# Patient Record
Sex: MALE | Race: WHITE | NOT HISPANIC OR LATINO | Employment: UNEMPLOYED | URBAN - METROPOLITAN AREA
[De-identification: names, ages, dates, MRNs, and addresses within clinical notes are randomized per-mention and may not be internally consistent; named-entity substitution may affect disease eponyms.]

---

## 2017-10-05 ENCOUNTER — HOSPITAL ENCOUNTER (INPATIENT)
Facility: CLINIC | Age: 47
LOS: 3 days | Discharge: NURSING, REHAB OR LONG-TERM CARE | End: 2017-10-09
Attending: EMERGENCY MEDICINE | Admitting: PSYCHIATRY & NEUROLOGY
Payer: COMMERCIAL

## 2017-10-05 DIAGNOSIS — F10.120 ALCOHOL ABUSE WITH UNCOMPLICATED INTOXICATION (H): ICD-10-CM

## 2017-10-05 DIAGNOSIS — R10.12 ABDOMINAL PAIN, LEFT UPPER QUADRANT: ICD-10-CM

## 2017-10-05 DIAGNOSIS — G89.29 OTHER CHRONIC BACK PAIN: ICD-10-CM

## 2017-10-05 DIAGNOSIS — R45.851 SUICIDAL IDEATION: ICD-10-CM

## 2017-10-05 DIAGNOSIS — M50.20 HERNIATED DISC, CERVICAL: Primary | ICD-10-CM
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
## 2017-10-05 DIAGNOSIS — F10.920 ALCOHOLIC INTOXICATION WITHOUT COMPLICATION (H): ICD-10-CM

## 2017-10-05 DIAGNOSIS — M54.89 OTHER CHRONIC BACK PAIN: ICD-10-CM
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
## 2017-10-05 DIAGNOSIS — F33.41 RECURRENT MAJOR DEPRESSIVE DISORDER, IN PARTIAL REMISSION (H): ICD-10-CM

## 2017-10-05 DIAGNOSIS — F43.10 PTSD (POST-TRAUMATIC STRESS DISORDER): ICD-10-CM

## 2017-10-05 LAB — ALCOHOL BREATH TEST: NORMAL (ref 0–0.01)

## 2017-10-05 PROCEDURE — 76775 US EXAM ABDO BACK WALL LIM: CPT | Performed by: EMERGENCY MEDICINE

## 2017-10-05 PROCEDURE — 99285 EMERGENCY DEPT VISIT HI MDM: CPT | Mod: 25 | Performed by: EMERGENCY MEDICINE

## 2017-10-05 PROCEDURE — 76775 US EXAM ABDO BACK WALL LIM: CPT | Mod: 26 | Performed by: EMERGENCY MEDICINE

## 2017-10-05 PROCEDURE — 76604 US EXAM CHEST: CPT | Mod: 26 | Performed by: EMERGENCY MEDICINE

## 2017-10-05 PROCEDURE — 76604 US EXAM CHEST: CPT | Performed by: EMERGENCY MEDICINE

## 2017-10-05 PROCEDURE — 82075 ASSAY OF BREATH ETHANOL: CPT | Performed by: EMERGENCY MEDICINE

## 2017-10-05 PROCEDURE — 90791 PSYCH DIAGNOSTIC EVALUATION: CPT

## 2017-10-05 NOTE — IP AVS SNAPSHOT
MRN:[id number]                      After Visit Summary   10/5/2017    Fito Daniel    MRN: [id number]           Thank you!     Thank you for choosing [facility] for your care. Our goal is always to provide you with excellent care.        Patient Information     Date Of Birth          1970        About your hospital stay     You were admitted on:  October 6, 2017 You last received care in the:   32NR    You were discharged on:  October 9, 2017       Who to Call     For medical emergencies, please call 911.  For non-urgent questions about your medical care, please call your primary care provider or clinic, None          Attending Provider     Provider Specialty    William Bui MD Emergency Medicine    Samara, Pb Perez MD Psychiatry       Primary Care Provider    Physician No Ref-Primary      Further instructions from your care team        Behavioral Discharge Planning and Instructions      Summary:  You were admitted on 10/5/2017  due to Depression and Suicidal Ideations.  You were treated by Dr Pb Pascual MD and discharged on 10/9/2017 from [facility] in [facility]      Principal Diagnosis:   1.  Major depressive disorder, recurrent, severe without psychotic features.   2.  Anxiety disorder, not otherwise specified.   3. Amphetamine Use Disorder .   4.  Opiate Use Disorder   5.  Amphetamine and marijuana abuse.   6.  History of alcohol dependence.     Health Care Follow-up Appointments:   Appointment Date/Time: Tomorrow, Tuesday, 10/10/2017 at 9 am     Prescriber: Dr. Jarod Hyatt[facility]  Address: [address] [place] [address] [address], [place] [address]  Phone: [phone]     You were not interested in therapy. You did want some referrals to chemical dependency treatment. The following treatment programs are in [place]:   [facility] at [address], [place] [address]  Phone: [phone]  [facility] has multiple  residential programs. Phone: [phone]  Attend all scheduled appointments with your outpatient providers. Call at least 24 hours in advance if you need to reschedule an appointment to ensure continued access to your outpatient providers.     Shelter Resource: Your girlfriend kicked you out, you've been staying with other homeless people. The following shelters in [place] are available. Please call them about admission.   [facility] [place] [address]  [phone]  [facility] [place] [place] [address]  [phone]    Major Treatments, Procedures and Findings:  You were provided with: a psychiatric assessment, assessed for medical stability, medication evaluation and/or management, group therapy and milieu management    Symptoms to Report: feeling more aggressive, increased confusion, losing more sleep, mood getting worse or thoughts of suicide    Early warning signs can include: increased depression or anxiety sleep disturbances increased thoughts or behaviors of suicide or self-harm  increased unusual thinking, such as paranoia or hearing voices    Safety and Wellness:  Take all medicines as directed.  Make no changes unless your doctor suggests them.      Follow treatment recommendations.  Refrain from alcohol and non-prescribed drugs.  If there is a concern for safety, call 911.    Resources:   [facility] crisis: 107.473.6696      National Central on Mental Illness (www.mn.pedro.org): 902.647.4664 or 415-168-6765.  Alcoholics Anonymous (www.alcoholics-anonymous.org): Check your phone book for your local chapter.  Suicide Awareness Voices of Education (SAVE) (www.save.org): 029-002-WKSR (2024)  National Suicide Prevention Line (www.mentalhealthmn.org): 619-221-PUKR (9125)  Mental Health Consumer/Survivor Network of [place] (www.mhcsn.net): 510.920.9436 or 459-188-2884  Mental Health Association of [place] (www.mentalhealth.org):  "498.802.8306 or 740-052-3237  Text 4 Life: txt \"LIFE\" to 25243 for immediate support and crisis intervention  Crisis text line: Text \"START\" to 837-308. Free, confidential, .    The treatment team has appreciated the opportunity to work with you.     If you have any questions or concerns our unit number is [phone]  You may be receiving a follow-up phone call within the next three days from a representative from behavioral health.    You have identified the best phone number to reach you as [phone]              Pending Results     No orders found from 10/3/2017 to 10/6/2017.            Admission Information     Date & Time Provider Department Dept. Phone    10/5/2017 Pb Pascual MD [facility] 32NR [phone]      Your Vitals Were     Blood Pressure Pulse Temperature Respirations Height Weight    123/82 87 98.3  F (36.8  C) (Oral) 16 1.778 m (5' 10\") 130.4 kg (287 lb 6.4 oz)    Pulse Oximetry BMI (Body Mass Index)                95% 41.24 kg/m2          MyChart Information     [employer] lets you send messages to your doctor, view your test results, renew your prescriptions, schedule appointments and more. To sign up, go to [email or web address] . Click on \"Log in\" on the left side of the screen, which will take you to the Welcome page. Then click on \"Sign up Now\" on the right side of the page.     You will be asked to enter the access code listed below, as well as some personal information. Please follow the directions to create your username and password.     Your access code is: [email or web address]  Expires: 2018  3:44 PM     Your access code will  in 90 days. If you need help or a new code, please call your [facility] or [phone].        Care EveryWhere ID     This is your Care EveryWhere ID. This could be used by other organizations to access your [facility] medical records  [id number]        Equal Access to Services     MADALYN ARRIAGA AH: inna Girard, " siri weems esvinnikky, myranda aidanjoseph lizamaaan ah. So [facility] [phone].    ATENCIÓN: Si jeremy mohr, tiene a love disposición servicios gratuitos de asistencia lingüística. Shala al [phone].    We comply with applicable federal civil rights laws and [place] laws. We do not discriminate on the basis of race, color, national origin, age, disability, sex, sexual orientation, or gender identity.               Review of your medicines      START taking        Dose / Directions    gabapentin 400 MG capsule   Commonly known as:  NEURONTIN   Used for:  PTSD (post-traumatic stress disorder)   Replaces:  gabapentin 600 MG tablet        Dose:  400 mg   Take 1 capsule (400 mg) by mouth 2 times daily   Quantity:  60 capsule   Refills:  1       ibuprofen 800 MG tablet   Commonly known as:  ADVIL/MOTRIN   Used for:  Herniated disc, cervical, Other chronic back pain        Dose:  800 mg   Take 1 tablet (800 mg) by mouth every 8 hours as needed   Quantity:  90 tablet   Refills:  1       thiamine 100 MG tablet   Used for:  Alcohol abuse with uncomplicated intoxication (H)        Dose:  100 mg   Take 1 tablet (100 mg) by mouth daily   Quantity:  30 tablet   Refills:  1         CONTINUE these medicines which may have CHANGED, or have new prescriptions. If we are uncertain of the size of tablets/capsules you have at home, strength may be listed as something that might have changed.        Dose / Directions    buPROPion 150 MG 24 hr tablet   Commonly known as:  WELLBUTRIN XL   This may have changed:  when to take this        Dose:  150 mg   Take 1 tablet (150 mg) by mouth daily   Quantity:  30 tablet   Refills:  1       FLUoxetine 20 MG capsule   Commonly known as:  PROzac   This may have changed:    - medication strength  - how much to take        Dose:  20 mg   Take 1 capsule (20 mg) by mouth daily   Quantity:  30 capsule   Refills:  1         CONTINUE these medicines which have NOT CHANGED        Dose /  Directions    buprenorphine-naloxone 8-2 MG Subl sublingual tablet   Commonly known as:  SUBOXONE   Used for:  Opioid dependence with uncomplicated intoxication (H)        Dose:  1 tablet   Place 1 tablet under the tongue 2 times daily   Quantity:  8 each   Refills:  0       diphenhydrAMINE 50 MG capsule   Commonly known as:  BENADRYL   Used for:  Depression, Insomnia due to other mental disorder        Dose:  50 mg   Take 1 capsule (50 mg) by mouth nightly as needed for sleep (or congestion)   Quantity:  30 capsule   Refills:  1       hydrOXYzine 50 MG tablet   Commonly known as:  ATARAX   Used for:  Depression        Dose:   mg   Take 1-2 tablets ( mg) by mouth every 4 hours as needed for anxiety   Quantity:  120 tablet   Refills:  1       naproxen 500 MG tablet   Commonly known as:  NAPROSYN   Used for:  Other back pain        Dose:  500 mg   Take 1 tablet (500 mg) by mouth 2 times daily (with meals)   Quantity:  60 tablet   Refills:  1       traZODone 50 MG tablet   Commonly known as:  DESYREL   Used for:  Depression, Insomnia due to other mental disorder        Take 1 tablet by mouth at bedtime.  May repeat x 1 as needed for insomnia.   Quantity:  60 tablet   Refills:  1         STOP taking     gabapentin 600 MG tablet   Commonly known as:  NEURONTIN   Replaced by:  gabapentin 400 MG capsule                Where to get your medicines      These medications were sent to [facility] [place], [place] - [address]  [address] [facility] [address]     Phone:  [phone]     buPROPion 150 MG 24 hr tablet    FLUoxetine 20 MG capsule    gabapentin 400 MG capsule    ibuprofen 800 MG tablet    thiamine 100 MG tablet                Protect others around you: Learn how to safely use, store and throw away your medicines at www.disposemymeds.org.             Medication List: This is a list of all your medications and when to take them. Check marks below indicate your daily home  schedule. Keep this list as a reference.      Medications           Morning Afternoon Evening Bedtime As Needed    buprenorphine-naloxone 8-2 MG Subl sublingual tablet   Commonly known as:  SUBOXONE   Place 1 tablet under the tongue 2 times daily                                buPROPion 150 MG 24 hr tablet   Commonly known as:  WELLBUTRIN XL   Take 1 tablet (150 mg) by mouth daily   Last time this was given:  150 mg on 10/9/2017 10:55 AM                                diphenhydrAMINE 50 MG capsule   Commonly known as:  BENADRYL   Take 1 capsule (50 mg) by mouth nightly as needed for sleep (or congestion)                                FLUoxetine 20 MG capsule   Commonly known as:  PROzac   Take 1 capsule (20 mg) by mouth daily   Last time this was given:  20 mg on 10/9/2017 10:54 AM                                gabapentin 400 MG capsule   Commonly known as:  NEURONTIN   Take 1 capsule (400 mg) by mouth 2 times daily   Last time this was given:  400 mg on 10/9/2017 10:54 AM                                hydrOXYzine 50 MG tablet   Commonly known as:  ATARAX   Take 1-2 tablets ( mg) by mouth every 4 hours as needed for anxiety   Last time this was given:  50 mg on 10/8/2017  7:55 PM                                ibuprofen 800 MG tablet   Commonly known as:  ADVIL/MOTRIN   Take 1 tablet (800 mg) by mouth every 8 hours as needed                                naproxen 500 MG tablet   Commonly known as:  NAPROSYN   Take 1 tablet (500 mg) by mouth 2 times daily (with meals)                                thiamine 100 MG tablet   Take 1 tablet (100 mg) by mouth daily   Last time this was given:  100 mg on 10/8/2017 10:20 AM                                traZODone 50 MG tablet   Commonly known as:  DESYREL   Take 1 tablet by mouth at bedtime.  May repeat x 1 as needed for insomnia.

## 2017-10-05 NOTE — IP AVS SNAPSHOT
32NR    [address]    [place] [address]    Phone:  [phone]                                       After Visit Summary   10/5/2017    Fito Daniel    MRN: [id number]           After Visit Summary Signature Page     I have received my discharge instructions, and my questions have been answered. I have discussed any challenges I see with this plan with the nurse or doctor.    ..........................................................................................................................................  Patient/Patient Representative Signature      ..........................................................................................................................................  Patient Representative Print Name and Relationship to Patient    ..................................................               ................................................  Date                                            Time    ..........................................................................................................................................  Reviewed by Signature/Title    ...................................................              ..............................................  Date                                                            Time

## 2017-10-06 ENCOUNTER — APPOINTMENT (OUTPATIENT)
Dept: GENERAL RADIOLOGY | Facility: CLINIC | Age: 47
End: 2017-10-06
Attending: EMERGENCY MEDICINE
Payer: COMMERCIAL

## 2017-10-06 PROBLEM — F32.A DEPRESSION: Status: ACTIVE | Noted: 2017-10-06

## 2017-10-06 LAB
ALBUMIN UR-MCNC: NEGATIVE MG/DL
AMPHETAMINES UR QL SCN: POSITIVE
APPEARANCE UR: CLEAR
BARBITURATES UR QL: NEGATIVE
BENZODIAZ UR QL: NEGATIVE
BILIRUB UR QL STRIP: NEGATIVE
CANNABINOIDS UR QL SCN: NEGATIVE
COCAINE UR QL: NEGATIVE
COLOR UR AUTO: YELLOW
ETHANOL UR QL SCN: POSITIVE
GLUCOSE UR STRIP-MCNC: NEGATIVE MG/DL
HGB UR QL STRIP: NEGATIVE
KETONES UR STRIP-MCNC: NEGATIVE MG/DL
LEUKOCYTE ESTERASE UR QL STRIP: NEGATIVE
MUCOUS THREADS #/AREA URNS LPF: PRESENT /LPF
NITRATE UR QL: NEGATIVE
OPIATES UR QL SCN: NEGATIVE
PH UR STRIP: 7 PH (ref 5–7)
RBC #/AREA URNS AUTO: <1 /HPF (ref 0–2)
SOURCE: ABNORMAL
SP GR UR STRIP: 1.02 (ref 1–1.03)
UROBILINOGEN UR STRIP-MCNC: NORMAL MG/DL (ref 0–2)
WBC #/AREA URNS AUTO: <1 /HPF (ref 0–2)

## 2017-10-06 PROCEDURE — 99222 1ST HOSP IP/OBS MODERATE 55: CPT | Mod: AI | Performed by: PSYCHIATRY & NEUROLOGY

## 2017-10-06 PROCEDURE — 25000132 ZZH RX MED GY IP 250 OP 250 PS 637: Performed by: PSYCHIATRY & NEUROLOGY

## 2017-10-06 PROCEDURE — 81001 URINALYSIS AUTO W/SCOPE: CPT | Performed by: FAMILY MEDICINE

## 2017-10-06 PROCEDURE — 80320 DRUG SCREEN QUANTALCOHOLS: CPT | Performed by: FAMILY MEDICINE

## 2017-10-06 PROCEDURE — 71020 XR CHEST 2 VW: CPT

## 2017-10-06 PROCEDURE — 99207 ZZC CDG-CODE INCORRECT PER BILLING BASED ON TIME: CPT | Performed by: PSYCHIATRY & NEUROLOGY

## 2017-10-06 PROCEDURE — HZ2ZZZZ DETOXIFICATION SERVICES FOR SUBSTANCE ABUSE TREATMENT: ICD-10-PCS | Performed by: PSYCHIATRY & NEUROLOGY

## 2017-10-06 PROCEDURE — 12400007 ZZH R&B MH INTERMEDIATE UMMC

## 2017-10-06 PROCEDURE — 80307 DRUG TEST PRSMV CHEM ANLYZR: CPT | Performed by: FAMILY MEDICINE

## 2017-10-06 RX ORDER — BUPROPION HYDROCHLORIDE 150 MG/1
150 TABLET ORAL DAILY
Status: DISCONTINUED | OUTPATIENT
Start: 2017-10-07 | End: 2017-10-09 | Stop reason: HOSPADM

## 2017-10-06 RX ORDER — GABAPENTIN 400 MG/1
400 CAPSULE ORAL 2 TIMES DAILY
Status: DISCONTINUED | OUTPATIENT
Start: 2017-10-06 | End: 2017-10-09 | Stop reason: HOSPADM

## 2017-10-06 RX ORDER — MULTIPLE VITAMINS W/ MINERALS TAB 9MG-400MCG
1 TAB ORAL DAILY
Status: DISCONTINUED | OUTPATIENT
Start: 2017-10-06 | End: 2017-10-09 | Stop reason: HOSPADM

## 2017-10-06 RX ORDER — FOLIC ACID 1 MG/1
1 TABLET ORAL DAILY
Status: DISCONTINUED | OUTPATIENT
Start: 2017-10-06 | End: 2017-10-09 | Stop reason: HOSPADM

## 2017-10-06 RX ORDER — FLUOXETINE 10 MG/1
10 CAPSULE ORAL DAILY
Status: COMPLETED | OUTPATIENT
Start: 2017-10-06 | End: 2017-10-06

## 2017-10-06 RX ORDER — IBUPROFEN 800 MG/1
800 TABLET, FILM COATED ORAL EVERY 6 HOURS PRN
Status: DISCONTINUED | OUTPATIENT
Start: 2017-10-06 | End: 2017-10-06

## 2017-10-06 RX ORDER — DICLOFENAC SODIUM 75 MG/1
75 TABLET, DELAYED RELEASE ORAL 2 TIMES DAILY PRN
Status: DISCONTINUED | OUTPATIENT
Start: 2017-10-06 | End: 2017-10-08

## 2017-10-06 RX ORDER — TRAZODONE HYDROCHLORIDE 50 MG/1
50 TABLET, FILM COATED ORAL
Status: DISCONTINUED | OUTPATIENT
Start: 2017-10-06 | End: 2017-10-09 | Stop reason: HOSPADM

## 2017-10-06 RX ORDER — HYDROXYZINE HYDROCHLORIDE 25 MG/1
25-50 TABLET, FILM COATED ORAL EVERY 4 HOURS PRN
Status: DISCONTINUED | OUTPATIENT
Start: 2017-10-06 | End: 2017-10-09 | Stop reason: HOSPADM

## 2017-10-06 RX ORDER — LANOLIN ALCOHOL/MO/W.PET/CERES
100 CREAM (GRAM) TOPICAL DAILY
Status: DISPENSED | OUTPATIENT
Start: 2017-10-06 | End: 2017-10-09

## 2017-10-06 RX ORDER — DIAZEPAM 5 MG
5-20 TABLET ORAL EVERY 30 MIN PRN
Status: DISCONTINUED | OUTPATIENT
Start: 2017-10-06 | End: 2017-10-08

## 2017-10-06 RX ORDER — ACETAMINOPHEN 325 MG/1
650 TABLET ORAL EVERY 4 HOURS PRN
Status: DISCONTINUED | OUTPATIENT
Start: 2017-10-06 | End: 2017-10-09 | Stop reason: HOSPADM

## 2017-10-06 RX ADMIN — GABAPENTIN 400 MG: 400 CAPSULE ORAL at 12:51

## 2017-10-06 RX ADMIN — FLUOXETINE 10 MG: 10 CAPSULE ORAL at 12:51

## 2017-10-06 ASSESSMENT — ACTIVITIES OF DAILY LIVING (ADL)
DRESS: INDEPENDENT
DRESS: INDEPENDENT
GROOMING: PROMPTS
GROOMING: INDEPENDENT
ORAL_HYGIENE: INDEPENDENT
ORAL_HYGIENE: PROMPTS
LAUNDRY: UNABLE TO COMPLETE
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
## 2017-10-06 NOTE — PROGRESS NOTES
PATIENT BELONGINGS:    Items with Patient:  -Two T-shirts and one pair of cargo shorts    Items in Patient Locker:  -belt, lighter, shoes with laces, pants with drawstring    ---No Items sent to security at time of admission.  ---No Cash, No Credit/Debit Cards, and No Medications at the time of admission.    A               Admission:  I am responsible for any personal items that are not sent to the safe or pharmacy.  [facility] is not responsible for loss, theft or damage of any property in my possession.    Signature:  _________________________________ Date: _______  Time: _____                                              Staff Signature:  ____________________________ Date: ________  Time: _____      2nd Staff person, if patient is unable/unwilling to sign:    Signature: ________________________________ Date: ________  Time: _____     Discharge:  [facility] has returned all of my personal belongings:    Signature: _________________________________ Date: ________  Time: _____                                          Staff Signature:  ____________________________ Date: ________  Time: _____
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
## 2017-10-06 NOTE — PLAN OF CARE
"Problem: Depressive Symptoms  Goal: Depressive Symptoms  Signs and symptoms of listed problems will be absent or manageable.  Outcome: No Change  Pt was in his room for most of the day. Pt refused breakfast, meds and to talk to writer. Pt stated \"I just want to sleep\". Pt is blunted, flat, withdrawn and somewhat irritable\". Pt came out for lunch, took some meds and requested the Dr order him Subutex for pain, pt has not been on it for over 1 month per pt.  notified. Told pt Ibuprofen has been ordered. Pt is now in his room sleeping again. Pt has no complaints about ETOH W/D. MSSA done, score equals 3. Will continue to monitor.       "

## 2017-10-06 NOTE — PROGRESS NOTES
INITIAL PSYCHOSOCIAL ASSESSMENT AND NOTE  I have reviewed the chart met with the patient, and developed Care Plan.  Information for assessment was obtained from: pt and chart  PRESENTING PROBLEM: Pt admitted to station 32 on a voluntary basis due to reported suicidal ideation with thoughts of jumping off bridge or overdosing. Pt had been drinking, breathalyzer 0.10, drug screen positive for amphetamines. Drinking daily X 4-6 weeks. Not taking Suboxone for 4-6 weeks. Girlfriend kicked him out 1 month ago because of his drinking. Pt reports playing Mauritian roulette last night.   The following areas have been assessed:  History of Mental Health and Chemical Dependency: This is pt's 2nd inpatient mh admit; first admit was 10/2015 following death of mother  Hx of 4 past CD treatments, last was  at [facility]  Living Situation: has been living in a homeless camp since girlfriend kicked him out 1 month ago  Significant Life Events (Illness, Abuse, Trauma, Death): mother  in  from heart attack. Dad  many years.   Family Description (Constellation, Family Psychiatric History): Pt is , 1 daughter age 12 who lives in [place] with her mother. Pt is the second of 4 children, 2 brothers, 1 sister.   Multiple family members have hx of substance abuse issues. No known family hx of mh issues.   Financial Status: strained, no income  Occupational History: unemployed, hx working construction  Educational Background: completed 3 years college     Service History: none  Legal Issues: none  Ethnic/Cultural Issues:   Spiritual Orientation:   Social Functioning (organizations, interests): minimal support, denies hobbies or pleasurable activities    Current Treatment providers:   Medication Management: Dr. Jarod [facility]  Address: [address] [place] [address] [address], [place] [address]  Phone: [phone]      Social Service Assessment/Plan:   Pt to  stabilize on medication. Pt will be seen and assessed by provider and staff. Groups will be offered to assist pt with increasing knowledge, strategies and coping techniques to help manage mental health. CTC will meet with pt to provide individualized mental health resources and support. CTC will assist with developing a care plan and after hospital appointments.       Discharge Plan:   Health Care Follow-up Appointments:   Appointment Date/Time: Tomorrow, Tuesday, 10/10/2017 at 9 am     Prescriber: Dr. Jarod [facility]  Address: [address] [place] [address] [address], [place] [address]  Phone: [phone]     You were not interested in therapy. You did want some referrals to chemical dependency treatment. The following treatment programs are in [place]:   [facility] at [address] [place] [address], [place] [address]  Phone: [phone]  [facility] has multiple residential programs. Phone: [phone]  Attend all scheduled appointments with your outpatient providers. Call at least 24 hours in advance if you need to reschedule an appointment to ensure continued access to your outpatient providers.     Shelter Resource: Your girlfriend kicked you out, you've been staying with other homeless people. The following shelters in [place] are available. Please call them about admission.   [facility]  [place], [place] [id number] [phone]  [facility]   [place], [place] [id number] [phone]

## 2017-10-06 NOTE — PLAN OF CARE
Problem: General Plan of Care (Inpatient Behavioral)  Goal: Team Discussion  Team Plan:   BEHAVIORAL TEAM DISCUSSION     Participants: Dr. Samara Neal, CTC  Lulú Godinez RN     Progress: Pt newly admitted due to suicidal ideation with plan; played Armenian roulette last night. Pt has increased depression, increased drinking. Off medication  Continued Stay Criteria/Rationale: pt is suicidal, depressed  Medical/Physical: see chart  Precautions:   Behavioral Orders   Procedures     Code 1 - Restrict to Unit     Routine Programming       As clinically indicated     Status 15       Every 15 minutes.     Suicide precautions     Plan: The patient will continue to meet w/ the treatment team for assessment and treatment planning, CTC will continue to work w/ for discharge planning.     Rationale for change in precautions or plan: pt is suicidal, depressed, significant stressors.

## 2017-10-06 NOTE — CONSULTS
Brief medicine consult note:  - Met with pt briefly to discuss pain management of his chronic neck pain. Due to his h/o opioid dependence, he was made clear that only non-narcotic medications would be used and he expressed understanding. Therefore will order prn Tylenol and prescription prn diclofenac. D/w Dr. Pascual. Pt otherwise medically stable and medicine will sign off. Please feel free to call with questions.       Vinay Thomas PA-C  Internal Medicine Hospitalist & Staff [facility]  [phone]

## 2017-10-06 NOTE — ED NOTES
"To  doing his search:    \"If I had a gun I would have already used it.\"    As he was putting his clothes in the bag, \"watch out for my 45.\"  "

## 2017-10-07 PROCEDURE — 25000132 ZZH RX MED GY IP 250 OP 250 PS 637: Performed by: EMERGENCY MEDICINE

## 2017-10-07 PROCEDURE — 25000132 ZZH RX MED GY IP 250 OP 250 PS 637: Performed by: PHYSICIAN ASSISTANT

## 2017-10-07 PROCEDURE — 25000132 ZZH RX MED GY IP 250 OP 250 PS 637: Performed by: PSYCHIATRY & NEUROLOGY

## 2017-10-07 PROCEDURE — 12400007 ZZH R&B MH INTERMEDIATE UMMC

## 2017-10-07 RX ADMIN — MULTIPLE VITAMINS W/ MINERALS TAB 1 TABLET: TAB at 08:44

## 2017-10-07 RX ADMIN — HYDROXYZINE HYDROCHLORIDE 50 MG: 25 TABLET ORAL at 13:40

## 2017-10-07 RX ADMIN — BUPROPION HYDROCHLORIDE 150 MG: 150 TABLET, EXTENDED RELEASE ORAL at 08:44

## 2017-10-07 RX ADMIN — FLUOXETINE 20 MG: 20 CAPSULE ORAL at 08:44

## 2017-10-07 RX ADMIN — FOLIC ACID 1 MG: 1 TABLET ORAL at 08:44

## 2017-10-07 RX ADMIN — Medication 100 MG: at 08:44

## 2017-10-07 RX ADMIN — DICLOFENAC SODIUM 75 MG: 75 TABLET, DELAYED RELEASE ORAL at 12:50

## 2017-10-07 RX ADMIN — GABAPENTIN 400 MG: 400 CAPSULE ORAL at 08:44

## 2017-10-07 ASSESSMENT — ACTIVITIES OF DAILY LIVING (ADL)
ORAL_HYGIENE: INDEPENDENT
ORAL_HYGIENE: INDEPENDENT
GROOMING: INDEPENDENT
DRESS: STREET CLOTHES;SCRUBS (BEHAVIORAL HEALTH);INDEPENDENT
DRESS: INDEPENDENT
GROOMING: HANDWASHING;INDEPENDENT

## 2017-10-07 NOTE — H&P
"[facility], [place]   Psychiatric History & Physical  Admission date: 10/5/2017        Chief Complaint:   \"I've just been out of it, mood is messed up\"        HPI:   Fito is a 47 year old male with history of depression, meth use disorder, opiate use disorder is admitted on a voluntary basis for worsening depressive moods, with suicidal ideation with recent attempts. He has been depressed since his mother  2 years ago, and has worsened recently, with increased suicidal thoughts and actions. He has drank alcohol daily for past 4-6 weeks, including recent use of meth which he used with a groups of friends. He has been kicked out of his girlfriend's house (whom he was living with) and has been homeless. He has been with a group of friends that have told him to stop taking his medications, along with using illicit substances. He has tried jumping off a bridge recently (someone pulled him off), he tried ODing but woke up, and tried playing russian NanoICEe with a bullet in one of the chambers. He continues to have some passive SI, without any plan. He is dysphoric, anhedonic, low energy/fatigued, lacks any interests in participating in things, and has fractured sleep. He mentions having continued anxiety about his life, along with pain, and mentions having cervical herniation pain in his neck. Denies HI/AH/VH.           Past Psychiatric History:   Past inpatient treatment: Past hospitalization in [place] in 10/2015 for similar conditions   Current outpatient psychiatrist: Dr. Savage at [facility]  Current outpatient therapist: Denies  Other (ACT team etc): Denies   Past suicide attempts: Similar suicide attemps, with attempting to jump off bridge   History of commitments: None  History of ECT: None        Substance Use and History:   Tobacco: Occational use  Alcohol: drinks to the point of intoxication daily for 4-5 weeks  Marijuana: Occational use  Cocaine: No recent " use  Amphetamines: Recent meth use  Opioids: Denies recent use, although has been on Suboxone 2 months ago, and had Dr. Savage prescribe his outpatient doses.    Last CD treatment 2 years ago         Past Medical History:   PAST MEDICAL HISTORY:   Past Medical History:   Diagnosis Date     ADD (attention deficit disorder)      Bipolar 1 disorder (H)      Borderline schizophrenia      Depressive disorder      Herniated disc, cervical      PTSD (post-traumatic stress disorder)      Substance abuse        PAST SURGICAL HISTORY:   Past Surgical History:   Procedure Laterality Date     SOFT TISSUE SURGERY      right foot MERSA             Family History:   FAMILY HISTORY: No family history on file.        Social History:   SOCIAL HISTORY:   Social History   Substance Use Topics     Smoking status: Current Some Day Smoker     Packs/day: 0.25     Last attempt to quit: 12/19/2011     Smokeless tobacco: Never Used     Alcohol use Yes            Physical ROS:   The patient endorsed rib pain, fatigue, and muscle pain. The remainder of 10-point review of systems was negative except as noted in HPI.         PTA Medications:     Prescriptions Prior to Admission   Medication Sig Dispense Refill Last Dose     BuPROPion HCl (WELLBUTRIN XL PO) Take 150 mg by mouth 2 times daily   More than a month at Unknown time     naproxen (NAPROSYN) 500 MG tablet Take 1 tablet (500 mg) by mouth 2 times daily (with meals) 60 tablet 1 More than a month at Unknown time     hydrOXYzine (ATARAX) 50 MG tablet Take 1-2 tablets ( mg) by mouth every 4 hours as needed for anxiety 120 tablet 1 More than a month at Unknown time     gabapentin (NEURONTIN) 600 MG tablet Take 1 tablet (600 mg) by mouth At Bedtime 30 tablet 1 More than a month at Unknown time     FLUoxetine (PROZAC) 40 MG capsule Take 1 capsule (40 mg) by mouth daily 30 capsule 1 More than a month at Unknown time     traZODone (DESYREL) 50 MG tablet Take 1 tablet by mouth at bedtime.  May  repeat x 1 as needed for insomnia. 60 tablet 1 More than a month at Unknown time     diphenhydrAMINE (BENADRYL) 50 MG capsule Take 1 capsule (50 mg) by mouth nightly as needed for sleep (or congestion) 30 capsule 1 Unknown at Unknown time     buprenorphine-naloxone (SUBOXONE) 8-2 MG SUBL Place 1 tablet under the tongue 2 times daily 8 each 0 More than a month at Unknown time          Allergies:     Allergies   Allergen Reactions     Hydrocodone Itching          Labs:     Recent Results (from the past 48 hour(s))   Alcohol breath test POCT    Collection Time: 10/05/17  9:47 PM   Result Value Ref Range    Alcohol Breath Test 0.105 with very poor effort. 0.00 - 0.01   Drug abuse screen 6 urine (tox)    Collection Time: 10/06/17  4:38 AM   Result Value Ref Range    Amphetamine Qual Urine Positive (A) NEG^Negative    Barbiturates Qual Urine Negative NEG^Negative    Benzodiazepine Qual Urine Negative NEG^Negative    Cannabinoids Qual Urine Negative NEG^Negative    Cocaine Qual Urine Negative NEG^Negative    Ethanol Qual Urine Positive (A) NEG^Negative    Opiates Qualitative Urine Negative NEG^Negative   UA with Microscopic reflex to Culture    Collection Time: 10/06/17  4:38 AM   Result Value Ref Range    Color Urine Yellow     Appearance Urine Clear     Glucose Urine Negative NEG^Negative mg/dL    Bilirubin Urine Negative NEG^Negative    Ketones Urine Negative NEG^Negative mg/dL    Specific Gravity Urine 1.017 1.003 - 1.035    Blood Urine Negative NEG^Negative    pH Urine 7.0 5.0 - 7.0 pH    Protein Albumin Urine Negative NEG^Negative mg/dL    Urobilinogen mg/dL Normal 0.0 - 2.0 mg/dL    Nitrite Urine Negative NEG^Negative    Leukocyte Esterase Urine Negative NEG^Negative    Source Clean catch urine     WBC Urine <1 0 - 2 /HPF    RBC Urine <1 0 - 2 /HPF    Mucous Urine Present (A) NEG^Negative /LPF          Physical and Psychiatric Examination:     /72  Pulse 98  Temp 98.4  F (36.9  C) (Oral)  Resp 16  Ht 1.778  "m (5' 10\")  Wt 82.7 kg (182 lb 6.4 oz)  SpO2 95%  BMI 26.17 kg/m2  Weight is 182 lbs 6.4 oz  Body mass index is 26.17 kg/(m^2).    Physical Exam:  I have reviewed the physical exam as documented by William Bui MD on 10/5/2017 and agree with findings and assessment and have no additional findings to add at this time.    Mental Status Exam:  Appearance: Disheleved  male. Multiple tattoos on forearms  Attitude:  Guarded, irritable  Eye Contact:  Intermittent  Mood:  \"Tired\"  Affect:  Mood congruent  Speech:  Regular in rate and rhythm  Language: fluent and intact in English  Psychomotor, Gait, Musculoskeletal:  Unremarkable  Throught Process:  [place]   Associations:  No loosening  Thought Content:  Passive SI, no HI/AH/VH  Insight:  Poor  Judgement:  Poor  Oriented to:  Person,place, time  Attention Span and Concentration:  Poor   Recent and Remote Memory:  Unclear, possibly deficient   Fund of Knowledge:  Average         Admission Diagnoses:      1.  Major depressive disorder, recurrent, severe without psychotic features.   2.  Anxiety disorder, not otherwise specified.   3. Amphetamine Use Disorder .   4.  Opiate Use Disorder   5.  Amphetamine and marijuana abuse.   6.  History of alcohol dependence.          Assessment & Plan:     Assessment:  Recent exacerbation of depression, with multiple suicidal behaviors with ongoing ideation. Depressive mood likely pecipitated/perpetuated by continued alcohol use along with an overlay of stimulant discontinuation/withdrawl effects. MSSA scores have been ranging 5-7 overall. He endorses pain, and has been on Percoet and recently Suboxone. He did request Suboxone, however we must first confirm if he will be in an place post discharge that will continue to prescribe Suboxone (unclear were he will be at this point), additionally his utox was not positive for opiates, and he denies using any synthetic opiates before. He does have documented cervical neck " herniation, although we will manage his pain via non-opiate pain medications at this time. He will need CD eval/treatment, and is willing to go to treatment. He has been off of his medications for over a month ,thus we will restart those.      Plan:  - Prozac 10 mg today, continue with 20 mg tomorrow  - Gabapentin 400 mg TID  - Wellbutrin  mg  - Thiamine 100 mg Daily     Legal:  Voluntary     Disposition:  Unclear at this time     Pb Pascual MD  [facility]    Spent 50   minutes on encounter, >50% of which was spent in counseling and/or coordination of care, consisting of taking history, reviewing systems, discussing medications and CD treatment.

## 2017-10-07 NOTE — PROGRESS NOTES
No SI, SIB stated/observed. Isolated/withdrawn to room all shift, refused to check in.     10/06/17 0250   Behavioral Health   Hallucinations other (see comment)  (LESLIE)   Thinking other (see comment)  (LESLIE)   Orientation other (see comment)  (LESLIE)   Memory other (see comment)  (LESLIE)   Insight poor   Judgement impaired   Eye Contact at examiner   Affect irritable   Mood mood is calm   Physical Appearance/Attire attire appropriate to age and situation   Hygiene neglected grooming - unclean body, hair, teeth   Suicidality other (see comments)  (no SI, SIB stated/observed)   Self Injury other (see comment)  (no si, sib stated/observed)   Elopement (N/A)   Activity isolative;withdrawn   Speech clear;coherent   Medication Sensitivity no stated side effects;no observed side effects   Psychomotor / Gait balanced;steady   Psycho Education   Type of Intervention 1:1 intervention   Response refuses   Activities of Daily Living   Hygiene/Grooming prompts   Oral Hygiene prompts   Dress independent   Laundry unable to complete   Room Organization independent   Behavioral Health Interventions   Depression maintain safety precautions;monitor need to revise level of observation;maintain safe secure environment;encourage nutrition and hydration;encourage participation / independence with adls;provide emotional support;establish therapeutic relationship;build upon strengths;monitor need for prn medication;monitor confusion, memory loss, decision making ability and reorient / intervene as needed   Social and Therapeutic Interventions (Depression) encourage socialization with peers;encourage effective boundaries with peers;encourage participation in therapeutic groups and milieu activities

## 2017-10-07 NOTE — PROGRESS NOTES
"Patient remained in room much of shift.  He declined to complete admission interview and refused HS Gabapentin.  At beginning of shift he stated \"I need subutex for my neck pain\", although he had already been told by his provider that opiates would not be prescribed.  Patient declined offer of tylenol and diclofenac.  Patient denied alcohol withdrawal symptoms.  MSSA scores were 6 and 3 due to elevated heart rate.  Later in shift he stated \"just let me sleep.\"  "

## 2017-10-08 PROCEDURE — 12400007 ZZH R&B MH INTERMEDIATE UMMC

## 2017-10-08 PROCEDURE — 25000132 ZZH RX MED GY IP 250 OP 250 PS 637: Performed by: EMERGENCY MEDICINE

## 2017-10-08 PROCEDURE — 25000132 ZZH RX MED GY IP 250 OP 250 PS 637: Performed by: PHYSICIAN ASSISTANT

## 2017-10-08 PROCEDURE — 25000132 ZZH RX MED GY IP 250 OP 250 PS 637: Performed by: PSYCHIATRY & NEUROLOGY

## 2017-10-08 RX ORDER — IBUPROFEN 800 MG/1
800 TABLET, FILM COATED ORAL EVERY 8 HOURS PRN
Status: DISCONTINUED | OUTPATIENT
Start: 2017-10-08 | End: 2017-10-09 | Stop reason: HOSPADM

## 2017-10-08 RX ADMIN — MULTIPLE VITAMINS W/ MINERALS TAB 1 TABLET: TAB at 10:18

## 2017-10-08 RX ADMIN — HYDROXYZINE HYDROCHLORIDE 50 MG: 25 TABLET ORAL at 19:55

## 2017-10-08 RX ADMIN — FOLIC ACID 1 MG: 1 TABLET ORAL at 10:20

## 2017-10-08 RX ADMIN — GABAPENTIN 400 MG: 400 CAPSULE ORAL at 10:20

## 2017-10-08 RX ADMIN — GABAPENTIN 400 MG: 400 CAPSULE ORAL at 19:55

## 2017-10-08 RX ADMIN — BUPROPION HYDROCHLORIDE 150 MG: 150 TABLET, EXTENDED RELEASE ORAL at 10:21

## 2017-10-08 RX ADMIN — Medication 100 MG: at 10:20

## 2017-10-08 RX ADMIN — FLUOXETINE 20 MG: 20 CAPSULE ORAL at 10:20

## 2017-10-08 RX ADMIN — NICOTINE POLACRILEX 4 MG: 2 GUM, CHEWING ORAL at 14:32

## 2017-10-08 RX ADMIN — DICLOFENAC SODIUM 75 MG: 75 TABLET, DELAYED RELEASE ORAL at 10:20

## 2017-10-08 ASSESSMENT — ACTIVITIES OF DAILY LIVING (ADL)
DRESS: INDEPENDENT
GROOMING: INDEPENDENT
ORAL_HYGIENE: INDEPENDENT
DRESS: STREET CLOTHES;INDEPENDENT
GROOMING: HANDWASHING;INDEPENDENT
ORAL_HYGIENE: INDEPENDENT

## 2017-10-08 NOTE — PLAN OF CARE
Problem: Depressive Symptoms  Goal: Depressive Symptoms  Signs and symptoms of listed problems will be absent or manageable.   Outcome: No Change  Patient was in the lounge until dinner, then went to bed.  He refused 1:1, HS gabapentin and offer of ibuprofen or diclofenac.  MSSA score was 3 for heart rate of 85.  Patient denied all withdrawal symptoms and none were visible.  He also denied suicidal thoughts.  Nursing will attempt to complete admission interview again when patient is cooperative.

## 2017-10-08 NOTE — PROGRESS NOTES
Pt c/o back pain. Pt stated he has herniated disc. Pt stated he takes Suboxone for the pain due to his opioid addition. Pt stated he was taking 8mg TID. Writer spoke with on-call and on-call prescribed ibuprofen 800mg every 8 hours PRN until pt sees Psychiatrist tomorrow.   Writer offered pt Ibuprofen and pt refused.  Pts MSSA was discontinued. Pts scored 6 and has not received medication since arriving to unit. Pt has not scored over 6 since arriving.

## 2017-10-09 ENCOUNTER — APPOINTMENT (OUTPATIENT)
Dept: GENERAL RADIOLOGY | Facility: CLINIC | Age: 47
End: 2017-10-09
Attending: EMERGENCY MEDICINE
Payer: COMMERCIAL

## 2017-10-09 ENCOUNTER — HOSPITAL ENCOUNTER (EMERGENCY)
Facility: CLINIC | Age: 47
Discharge: HOME | End: 2017-10-09
Attending: EMERGENCY MEDICINE | Admitting: EMERGENCY MEDICINE
Payer: COMMERCIAL

## 2017-10-09 ENCOUNTER — APPOINTMENT (OUTPATIENT)
Dept: ULTRASOUND IMAGING | Facility: CLINIC | Age: 47
End: 2017-10-09
Attending: EMERGENCY MEDICINE
Payer: COMMERCIAL

## 2017-10-09 VITALS
OXYGEN SATURATION: 98 % | SYSTOLIC BLOOD PRESSURE: 116 MMHG | TEMPERATURE: 97.4 F | DIASTOLIC BLOOD PRESSURE: 82 MMHG | RESPIRATION RATE: 16 BRPM

## 2017-10-09 VITALS
HEART RATE: 87 BPM | SYSTOLIC BLOOD PRESSURE: 123 MMHG | BODY MASS INDEX: 41.14 KG/M2 | DIASTOLIC BLOOD PRESSURE: 82 MMHG | HEIGHT: 70 IN | OXYGEN SATURATION: 95 % | TEMPERATURE: 98.3 F | WEIGHT: 287.4 LBS | RESPIRATION RATE: 16 BRPM

## 2017-10-09 DIAGNOSIS — M79.662 PAIN OF LEFT LOWER LEG: ICD-10-CM

## 2017-10-09 PROCEDURE — 25000132 ZZH RX MED GY IP 250 OP 250 PS 637: Performed by: PSYCHIATRY & NEUROLOGY

## 2017-10-09 PROCEDURE — 99283 EMERGENCY DEPT VISIT LOW MDM: CPT | Mod: Z6 | Performed by: EMERGENCY MEDICINE

## 2017-10-09 PROCEDURE — 73590 X-RAY EXAM OF LOWER LEG: CPT | Mod: LT

## 2017-10-09 PROCEDURE — 25000132 ZZH RX MED GY IP 250 OP 250 PS 637: Performed by: EMERGENCY MEDICINE

## 2017-10-09 PROCEDURE — 99284 EMERGENCY DEPT VISIT MOD MDM: CPT | Mod: 25 | Performed by: EMERGENCY MEDICINE

## 2017-10-09 PROCEDURE — 93971 EXTREMITY STUDY: CPT | Mod: LT

## 2017-10-09 PROCEDURE — 99238 HOSP IP/OBS DSCHRG MGMT 30/<: CPT | Performed by: PSYCHIATRY & NEUROLOGY

## 2017-10-09 RX ORDER — BUPROPION HYDROCHLORIDE 150 MG/1
150 TABLET ORAL DAILY
Qty: 30 TABLET | Refills: 1 | Status: SHIPPED | OUTPATIENT
Start: 2017-10-09 | End: 2024-04-10

## 2017-10-09 RX ORDER — IBUPROFEN 800 MG/1
800 TABLET, FILM COATED ORAL EVERY 8 HOURS PRN
Qty: 90 TABLET | Refills: 1 | Status: SHIPPED | OUTPATIENT
Start: 2017-10-09 | End: 2024-04-09

## 2017-10-09 RX ORDER — GABAPENTIN 400 MG/1
400 CAPSULE ORAL 2 TIMES DAILY
Qty: 60 CAPSULE | Refills: 1 | Status: SHIPPED | OUTPATIENT
Start: 2017-10-09 | End: 2024-04-09

## 2017-10-09 RX ORDER — LANOLIN ALCOHOL/MO/W.PET/CERES
100 CREAM (GRAM) TOPICAL DAILY
Qty: 30 TABLET | Refills: 1 | Status: SHIPPED | OUTPATIENT
Start: 2017-10-09 | End: 2024-04-10

## 2017-10-09 RX ADMIN — FLUOXETINE 20 MG: 20 CAPSULE ORAL at 10:54

## 2017-10-09 RX ADMIN — BUPROPION HYDROCHLORIDE 150 MG: 150 TABLET, EXTENDED RELEASE ORAL at 10:55

## 2017-10-09 RX ADMIN — NICOTINE POLACRILEX 4 MG: 2 GUM, CHEWING ORAL at 15:45

## 2017-10-09 RX ADMIN — FOLIC ACID 1 MG: 1 TABLET ORAL at 10:54

## 2017-10-09 RX ADMIN — GABAPENTIN 400 MG: 400 CAPSULE ORAL at 10:54

## 2017-10-09 RX ADMIN — NICOTINE POLACRILEX 4 MG: 2 GUM, CHEWING ORAL at 12:17

## 2017-10-09 RX ADMIN — NICOTINE POLACRILEX 4 MG: 2 GUM, CHEWING ORAL at 13:41

## 2017-10-09 RX ADMIN — MULTIPLE VITAMINS W/ MINERALS TAB 1 TABLET: TAB at 10:54

## 2017-10-09 ASSESSMENT — ACTIVITIES OF DAILY LIVING (ADL)
LAUNDRY: WITH SUPERVISION
GROOMING: INDEPENDENT
ORAL_HYGIENE: INDEPENDENT
DRESS: STREET CLOTHES

## 2017-10-09 NOTE — ED AVS SNAPSHOT
[facility], Emergency Department    [address]    [place] [address]    Phone:  [phone]    Fax:  [phone]                                       Fito Daniel   MRN: [id number]    Department:  [facility], Emergency Department   Date of Visit:  10/9/2017           Patient Information     Date Of Birth          1970        Your diagnoses for this visit were:     Pain of left lower leg        You were seen by Wilton Pedro MD.        Discharge Instructions       Please make an appointment to follow up with Your Primary Care Provider and Orthopedics (phone: [phone]) as soon as possible.    Tylenol and/or ibuprofen for pain.    Knee immobilizer for comfort.    Return to the emergency department for any problems.      24 Hour Appointment Hotline       To make an appointment at any [facility] clinic, call [phone] ([phone]). If you don't have a family doctor or clinic, we will help you find one. [facility] clinics are conveniently located to serve the needs of you and your family.          ED Discharge Orders     Knee immobilizer                    Review of your medicines      Our records show that you are taking the medicines listed below. If these are incorrect, please call your family doctor or clinic.        Dose / Directions Last dose taken    buprenorphine-naloxone 8-2 MG Subl sublingual tablet   Commonly known as:  SUBOXONE   Dose:  1 tablet   Quantity:  8 each        Place 1 tablet under the tongue 2 times daily   Refills:  0        buPROPion 150 MG 24 hr tablet   Commonly known as:  WELLBUTRIN XL   Dose:  150 mg   Quantity:  30 tablet        Take 1 tablet (150 mg) by mouth daily   Refills:  1        diphenhydrAMINE 50 MG capsule   Commonly known as:  BENADRYL   Dose:  50 mg   Quantity:  30 capsule        Take 1 capsule (50 mg) by mouth nightly as needed for sleep (or congestion)   Refills:  1        FLUoxetine 20 MG capsule   Commonly known as:  PROzac    Dose:  20 mg   Quantity:  30 capsule        Take 1 capsule (20 mg) by mouth daily   Refills:  1        gabapentin 400 MG capsule   Commonly known as:  NEURONTIN   Dose:  400 mg   Quantity:  60 capsule        Take 1 capsule (400 mg) by mouth 2 times daily   Refills:  1        hydrOXYzine 50 MG tablet   Commonly known as:  ATARAX   Dose:   mg   Quantity:  120 tablet        Take 1-2 tablets ( mg) by mouth every 4 hours as needed for anxiety   Refills:  1        ibuprofen 800 MG tablet   Commonly known as:  ADVIL/MOTRIN   Dose:  800 mg   Quantity:  90 tablet        Take 1 tablet (800 mg) by mouth every 8 hours as needed   Refills:  1        naproxen 500 MG tablet   Commonly known as:  NAPROSYN   Dose:  500 mg   Quantity:  60 tablet        Take 1 tablet (500 mg) by mouth 2 times daily (with meals)   Refills:  1        thiamine 100 MG tablet   Dose:  100 mg   Quantity:  30 tablet        Take 1 tablet (100 mg) by mouth daily   Refills:  1        traZODone 50 MG tablet   Commonly known as:  DESYREL   Quantity:  60 tablet        Take 1 tablet by mouth at bedtime.  May repeat x 1 as needed for insomnia.   Refills:  1                Procedures and tests performed during your visit     Tib/Fib XR, left    US Lower Extremity Venous Duplex Left      Orders Needing Specimen Collection     None      Pending Results     No orders found from 10/7/2017 to 10/10/2017.            Pending Culture Results     No orders found from 10/7/2017 to 10/10/2017.            Pending Results Instructions     If you had any lab results that were not finalized at the time of your Discharge, you can call the ED Lab Result RN at [phone]. You will be contacted by this team for any positive Lab results or changes in treatment. The nurses are available 7 days a week from 10A to 6:30P.  You can leave a message 24 hours per day and they will return your call.        Thank you for choosing [facility]       Thank you for choosing [facility] for  "your care. Our goal is always to provide you with excellent care. Hearing back from our patients is one way we can continue to improve our services. Please take a few minutes to complete the written survey that you may receive in the mail after you visit with us. Thank you!        [employer] Information     tadoÂ° lets you send messages to your doctor, view your test results, renew your prescriptions, schedule appointments and more. To sign up, go to [email or web address] . Click on \"Log in\" on the left side of the screen, which will take you to the Welcome page. Then click on \"Sign up Now\" on the right side of the page.     You will be asked to enter the access code listed below, as well as some personal information. Please follow the directions to create your username and password.     Your access code is: [email or web address]  Expires: 2018  3:44 PM     Your access code will  in 90 days. If you need help or a new code, please call your [facility] clinic or [phone].        Care EveryWhere ID     This is your Care EveryWhere ID. This could be used by other organizations to access your [facility] medical records  [id number]        Equal Access to Services     PASCUAL ARRIAGA AH: Juan C Lopez, inna duong, siri katz, myranda acosta. So [facility] [phone].    ATENCIÓN: Si habla español, tiene a love disposición servicios gratuitos de asistencia lingüística. Llame al [phone].    We comply with applicable federal civil rights laws and [place] laws. We do not discriminate on the basis of race, color, national origin, age, disability, sex, sexual orientation, or gender identity.            After Visit Summary       This is your record. Keep this with you and show to your community pharmacist(s) and doctor(s) at your next visit.                  "

## 2017-10-09 NOTE — PLAN OF CARE
Problem: Depressive Symptoms  Goal: Depressive Symptoms  Signs and symptoms of listed problems will be absent or manageable.   Outcome: No Change  Patient again in lounge from mid-afternoon til dinner, but did not interact with peers.  He refused  PRN ibuprofen for neck pain.  Patient declined to meet with writer to complete admission interview, and was visibly uncomfortable when asked about suicidal ideation, which he denied, and physical pain, which he endorsed.  Patient had a female visitor this evening.  After she left patient requested medication for anxiety, which he rated 9 out of 10, and was given PRN Vistaril.  He also agreed to take scheduled Gabapentin, and when asked about depression rated it 8 out of 10.

## 2017-10-09 NOTE — PLAN OF CARE
"Problem: Depressive Symptoms  Goal: Depressive Symptoms  Signs and symptoms of listed problems will be absent or manageable.   Outcome: Adequate for Discharge Date Met:  10/09/17  Patient denied suicidal ideation and stated he can keep himself safe after discharge.  Writer attempted to review discharge instructions with him but he declined.  Patient stated \"I don't need to do nothing with those.  I know the drill.\"  He stated he had no questions.  He was discharged to home with supply of discharge medications at 1620.      "

## 2017-10-09 NOTE — DISCHARGE INSTRUCTIONS
Behavioral Discharge Planning and Instructions      Summary:  You were admitted on 10/5/2017  due to Depression and Suicidal Ideations.  You were treated by Dr Pb Pascual MD and discharged on 10/9/2017 from [facility] in [facility]      Principal Diagnosis:   1.  Major depressive disorder, recurrent, severe without psychotic features.   2.  Anxiety disorder, not otherwise specified.   3. Amphetamine Use Disorder .   4.  Opiate Use Disorder   5.  Amphetamine and marijuana abuse.   6.  History of alcohol dependence.     Health Care Follow-up Appointments:   Appointment Date/Time: Tomorrow, Tuesday, 10/10/2017 at 9 am     Prescriber: Dr. Jarod [facility]  Address: [address] [place] [address] [id number], [place] [address]  Phone: [phone]     You were not interested in therapy. You did want some referrals to chemical dependency treatment. The following treatment programs are in [place]:   [facility] at [address] [place] [address], [place] [address]  Phone: [phone]  [facility] has multiple residential programs. Phone: [phone]  Attend all scheduled appointments with your outpatient providers. Call at least 24 hours in advance if you need to reschedule an appointment to ensure continued access to your outpatient providers.     Shelter Resource: Your girlfriend kicked you out, you've been staying with other homeless people. The following shelters in [place] are available. Please call them about admission.   [facility] [place], [place] [id number] [phone]  [facility]   [place], [place] [id number] [phone]    Major Treatments, Procedures and Findings:  You were provided with: a psychiatric assessment, assessed for medical stability, medication evaluation and/or management, group therapy and milieu management    Symptoms to Report: feeling more aggressive, increased confusion,  "losing more sleep, mood getting worse or thoughts of suicide    Early warning signs can include: increased depression or anxiety sleep disturbances increased thoughts or behaviors of suicide or self-harm  increased unusual thinking, such as paranoia or hearing voices    Safety and Wellness:  Take all medicines as directed.  Make no changes unless your doctor suggests them.      Follow treatment recommendations.  Refrain from alcohol and non-prescribed drugs.  If there is a concern for safety, call 911.    Resources:   [facility] crisis: [phone]      National [place] on Mental Illness (www.[place].pedro.org): 819.585.3493 or 424-169-9310.  Alcoholics Anonymous (www.alcoholics-anonymous.org): Check your phone book for your local chapter.  Suicide Awareness Voices of Education (SAVE) (www.save.org): 437-360-WEHN (4783)  National Suicide Prevention Line (www.mentalhealthmn.org): 303-474-AZLU (3261)  Mental Health Consumer/Survivor Network of [place] (www.mhcsn.net): 916.985.5510 or 334-549-5161  Mental Health Association of [place] (www.mentalhealth.org): 201.206.3101 or 952-133-0740  Text 4 Life: txt \"LIFE\" to 43267 for immediate support and crisis intervention  Crisis text line: Text \"START\" to 309-470. Free, confidential, 24/7.    The treatment team has appreciated the opportunity to work with you.     If you have any questions or concerns our unit number is [phone]  You may be receiving a follow-up phone call within the next three days from a representative from behavioral health.    You have identified the best phone number to reach you as [phone]            "

## 2017-10-09 NOTE — ED AVS SNAPSHOT
[facility], [place], Emergency Department    [address]    [facility] [address]    Phone:  [phone]    Fax:  [phone]                                       Fito Daniel   MRN: [id number]    Department:  [facility], Emergency Department   Date of Visit:  10/9/2017           After Visit Summary Signature Page     I have received my discharge instructions, and my questions have been answered. I have discussed any challenges I see with this plan with the nurse or doctor.    ..........................................................................................................................................  Patient/Patient Representative Signature      ..........................................................................................................................................  Patient Representative Print Name and Relationship to Patient    ..................................................               ................................................  Date                                            Time    ..........................................................................................................................................  Reviewed by Signature/Title    ...................................................              ..............................................  Date                                                            Time

## 2017-10-09 NOTE — DISCHARGE SUMMARY
[facility], [place]   Psychiatric Discharge Summary      Fito Daniel MRN# [id number]   Age: 47 year old YOB: 1970     Date of Admission:  10/5/2017  Date of Discharge:  10/09/17  Admitting Physician:  Pb Pascual MD  Discharge Physician:  Pb Pascual MD         Summary/Hospital Course/Disposition:   Hospital Summary:    Fito is a 47 year old male with history of depression, meth use disorder, opiate use disorder is admitted on 10/5 on a voluntary basis for worsening depressive moods, with suicidal ideation with recent attempts. He has been depressed since his mother  2 years ago, and has worsened recently, with increased suicidal thoughts and actions. He had not been on his medications for over a month, which also likely had precipitated his worsening depression. His utox was positive to meth and alcohol. He was placed on MSSA protocol with Valium PRN. He was restarted on Prozac and gabapentin. He requested Suboxone, but [place]  revealed he has not been prescribed this since 2017 by Dr. Savage, thus we would not induce him with Suboxone here, and recommended to follow up with Dr. Savage for that medication. His MSSA scores remained low at 4-5 for over 12 hours, and didn't require any Valium, thus MSSA protocol was d/c'd. He tolerated medications and has subjective improvements in mood, and sleep. He denied suicidal ideation , homicidal ideation or any AH/VH. He was willing to go to CD treatment. He was given information for CD assessment, along with information for follow up appointment with Dr. Savage (which he made for tomorrow).            DIagnoses:       1.  Major depressive disorder, recurrent, severe without psychotic features.   2.  Anxiety disorder, not otherwise specified.   3. Amphetamine Use Disorder .   4.  Opiate Use Disorder   5.  Amphetamine and marijuana abuse.   6.  History of alcohol dependence.          Labs:   No results found for  this or any previous visit (from the past 24 hour(s)).         Consults:   Brief medicine consult note:  - Met with pt briefly to discuss pain management of his chronic neck pain. Due to his h/o opioid dependence, he was made clear that only non-narcotic medications would be used and he expressed understanding. Therefore will order prn Tylenol and prescription prn diclofenac. D/w Dr. Pascual. Pt otherwise medically stable and medicine will sign off. Please feel free to call with questions.            Discharge Medications:        Review of your medicines      START taking       Dose / Directions    gabapentin 400 MG capsule   Commonly known as:  NEURONTIN   Used for:  PTSD (post-traumatic stress disorder)   Replaces:  gabapentin 600 MG tablet        Dose:  400 mg   Take 1 capsule (400 mg) by mouth 2 times daily   Quantity:  60 capsule   Refills:  1       ibuprofen 800 MG tablet   Commonly known as:  ADVIL/MOTRIN   Used for:  Herniated disc, cervical, Other chronic back pain        Dose:  800 mg   Take 1 tablet (800 mg) by mouth every 8 hours as needed   Quantity:  90 tablet   Refills:  1       thiamine 100 MG tablet   Used for:  Alcohol abuse with uncomplicated intoxication (H)        Dose:  100 mg   Take 1 tablet (100 mg) by mouth daily   Quantity:  30 tablet   Refills:  1         CONTINUE these medicines which may have CHANGED, or have new prescriptions. If we are uncertain of the size of tablets/capsules you have at home, strength may be listed as something that might have changed.       Dose / Directions    buPROPion 150 MG 24 hr tablet   Commonly known as:  WELLBUTRIN XL   This may have changed:  when to take this        Dose:  150 mg   Take 1 tablet (150 mg) by mouth daily   Quantity:  30 tablet   Refills:  1       FLUoxetine 20 MG capsule   Commonly known as:  PROzac   This may have changed:    - medication strength  - how much to take        Dose:  20 mg   Take 1 capsule (20 mg) by mouth daily   Quantity:  30  capsule   Refills:  1         CONTINUE these medicines which have NOT CHANGED       Dose / Directions    buprenorphine-naloxone 8-2 MG Subl sublingual tablet   Commonly known as:  SUBOXONE   Used for:  Opioid dependence with uncomplicated intoxication (H)        Dose:  1 tablet   Place 1 tablet under the tongue 2 times daily   Quantity:  8 each   Refills:  0       diphenhydrAMINE 50 MG capsule   Commonly known as:  BENADRYL   Used for:  Depression, Insomnia due to other mental disorder        Dose:  50 mg   Take 1 capsule (50 mg) by mouth nightly as needed for sleep (or congestion)   Quantity:  30 capsule   Refills:  1       hydrOXYzine 50 MG tablet   Commonly known as:  ATARAX   Used for:  Depression        Dose:   mg   Take 1-2 tablets ( mg) by mouth every 4 hours as needed for anxiety   Quantity:  120 tablet   Refills:  1       naproxen 500 MG tablet   Commonly known as:  NAPROSYN   Used for:  Other back pain        Dose:  500 mg   Take 1 tablet (500 mg) by mouth 2 times daily (with meals)   Quantity:  60 tablet   Refills:  1       traZODone 50 MG tablet   Commonly known as:  DESYREL   Used for:  Depression, Insomnia due to other mental disorder        Take 1 tablet by mouth at bedtime.  May repeat x 1 as needed for insomnia.   Quantity:  60 tablet   Refills:  1         STOP taking          gabapentin 600 MG tablet   Commonly known as:  NEURONTIN   Replaced by:  gabapentin 400 MG capsule                Where to get your medicines      These medications were sent to [facility] [place], [place] - [address]  [address] [address]     Phone:  [phone]      buPROPion 150 MG 24 hr tablet     FLUoxetine 20 MG capsule     gabapentin 400 MG capsule     ibuprofen 800 MG tablet     thiamine 100 MG tablet                  Mental Status Examination:   Appearance: Disheleved  male. Multiple tattoos on forearms  Attitude:  Guarded, irritable  Eye  "Contact:  Intermittent  Mood:  \"Ok\"  Affect:  Mood congruent  Speech:  Regular in rate and rhythm  Language: fluent and intact in English  Psychomotor, Gait, Musculoskeletal:  Unremarkable  Throught Process:  [place]   Associations:  No loosening  Thought Content:  No SI/HI/AH/VH  Insight:  Poor  Judgement:  Poor  Oriented to:  Person,place, time  Attention Span and Concentration:  Poor   Recent and Remote Memory:  Unclear, possibly deficient   Fund of Knowledge:  Average         Discharge Plan:   No discharge procedures on file.    - Patient was discharged with medications, information about CD assessment, and follow up appointment with Dr. Savage for his pain issues.     Pb Pascual MD  [facility]     Spent  20  minutes on encounter, >50% of which was spent in counseling and/or coordination of care, consisting of discussing symptoms, medications, and follow up information.       "

## 2017-10-10 NOTE — PROGRESS NOTES
10/10/17 0600   General Information   Date Initially Attended OT 10/09/17   General Observation/Plan   General Observations/Plan See Comments     Pt initially attempted to attend OT 10/9, but was only there 5 minutes and was called out. Returned to take a drawing he started, left, did not return. Pt has been discharged.
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
## 2017-10-10 NOTE — DISCHARGE INSTRUCTIONS
Please make an appointment to follow up with Your Primary Care Provider and Orthopedics (phone: [phone]) as soon as possible.    Tylenol and/or ibuprofen for pain.    Knee immobilizer for comfort.    Return to the emergency department for any problems.

## 2023-11-18 ENCOUNTER — TELEPHONE (OUTPATIENT)
Dept: BEHAVIORAL HEALTH | Facility: CLINIC | Age: 53
End: 2023-11-18
Payer: COMMERCIAL

## 2023-11-18 ENCOUNTER — HOSPITAL ENCOUNTER (EMERGENCY)
Facility: CLINIC | Age: 53
Discharge: NURSING, REHAB OR LONG-TERM CARE | End: 2023-11-20
Attending: EMERGENCY MEDICINE | Admitting: EMERGENCY MEDICINE
Payer: COMMERCIAL

## 2023-11-18 DIAGNOSIS — R45.851 SUICIDAL IDEATION: ICD-10-CM

## 2023-11-18 DIAGNOSIS — F11.90 OPIOID USE DISORDER: ICD-10-CM

## 2023-11-18 DIAGNOSIS — F20.9 SUBCHRONIC SCHIZOPHRENIA (H): ICD-10-CM

## 2023-11-18 DIAGNOSIS — F11.20 OPIOID TYPE DEPENDENCE, CONTINUOUS (H): Primary | ICD-10-CM

## 2023-11-18 DIAGNOSIS — F90.0 ATTENTION DEFICIT HYPERACTIVITY DISORDER, INATTENTIVE TYPE: ICD-10-CM

## 2023-11-18 PROBLEM — F19.180: Status: ACTIVE | Noted: 2023-11-18

## 2023-11-18 PROBLEM — F33.41 MAJOR DEPRESSIVE DISORDER, RECURRENT EPISODE, IN PARTIAL REMISSION (H): Status: ACTIVE | Noted: 2023-11-18

## 2023-11-18 PROCEDURE — 99285 EMERGENCY DEPT VISIT HI MDM: CPT | Performed by: EMERGENCY MEDICINE

## 2023-11-18 PROCEDURE — G2213 INITIAT MED ASSIST TX IN ER: HCPCS | Performed by: EMERGENCY MEDICINE

## 2023-11-18 PROCEDURE — 87635 SARS-COV-2 COVID-19 AMP PRB: CPT | Performed by: EMERGENCY MEDICINE

## 2023-11-18 PROCEDURE — 99285 EMERGENCY DEPT VISIT HI MDM: CPT

## 2023-11-18 PROCEDURE — 81001 URINALYSIS AUTO W/SCOPE: CPT | Mod: XU | Performed by: EMERGENCY MEDICINE

## 2023-11-18 PROCEDURE — 80307 DRUG TEST PRSMV CHEM ANLYZR: CPT | Performed by: EMERGENCY MEDICINE

## 2023-11-18 RX ORDER — TRAZODONE HYDROCHLORIDE 50 MG/1
50 TABLET, FILM COATED ORAL
Status: DISCONTINUED | OUTPATIENT
Start: 2023-11-18 | End: 2023-11-20 | Stop reason: HOSPADM

## 2023-11-18 RX ORDER — HYDROXYZINE HYDROCHLORIDE 50 MG/1
50 TABLET, FILM COATED ORAL EVERY 6 HOURS PRN
Status: DISCONTINUED | OUTPATIENT
Start: 2023-11-18 | End: 2023-11-20 | Stop reason: HOSPADM

## 2023-11-18 RX ORDER — IBUPROFEN 200 MG
400 TABLET ORAL EVERY 6 HOURS PRN
Status: DISCONTINUED | OUTPATIENT
Start: 2023-11-18 | End: 2023-11-20 | Stop reason: HOSPADM

## 2023-11-18 RX ORDER — HYDROXYZINE HYDROCHLORIDE 25 MG/1
25 TABLET, FILM COATED ORAL EVERY 6 HOURS PRN
Status: DISCONTINUED | OUTPATIENT
Start: 2023-11-18 | End: 2023-11-20 | Stop reason: HOSPADM

## 2023-11-18 ASSESSMENT — ACTIVITIES OF DAILY LIVING (ADL)
ADLS_ACUITY_SCORE: 35

## 2023-11-18 NOTE — ED PROVIDER NOTES
"ED Provider Note  [facility]      History     Chief Complaint   Patient presents with    Suicidal     Has a plan to kill self with drugs, specifically fentanyl and has a way to obtain it. Uses fentanyl IV and last used last night. Denies other drug or alcohol use.     Hand Pain     Recent injury with ongoing swelling and pain     HPI  Fito Daniel is a 53 year old male with hx of bipolar 1, borderline schizophrenia, MDD, PTSD, substance abuse who presents to the ED suicidal with a plan to overdose on fentanyl.  He lives at the [place] and says drugs are easily available.  He denies trigger.  He has hx of 12 prior overdose attempts.  He has been using drugs on and off per his advocate.  He last used fentanyl last night.  He refuses to safety plan.  He days his suicidal thoughts are \"10/10.\"  He has not been on mental health meds for over 2 years but would like to get back on them. He does not have any providers.  Hx of prior admissions.  He also has left hand pain which has been constant since a bad injection in October.  It is not worsening, just the same.     Physical Exam   BP: 112/75  Pulse: 79  Temp: 98.3  F (36.8  C)  Resp: 16  Height: 177.8 cm (5' 10\")  Weight: 85.9 kg (189 lb 6.4 oz)  SpO2: 98 %  Physical Exam  Vitals and nursing note reviewed.   Constitutional:       General: He is not in acute distress.     Appearance: He is not ill-appearing, toxic-appearing or diaphoretic.      Comments: Sleepy, but awakens easily.    HENT:      Head: Normocephalic and atraumatic.      Nose: No congestion or rhinorrhea.   Eyes:      Extraocular Movements: Extraocular movements intact.   Cardiovascular:      Rate and Rhythm: Normal rate.      Pulses: Normal pulses.   Pulmonary:      Effort: Pulmonary effort is normal.   Musculoskeletal:         General: No deformity or signs of injury.      Cervical back: Normal range of motion.   Skin:     General: Skin is warm and dry.      Comments: Red " scar back of left hand.  Not warm to touch. Appears old.    Neurological:      General: No focal deficit present.      Mental Status: He is alert and oriented to person, place, and time.   Psychiatric:         Attention and Perception: Attention and perception normal.         Mood and Affect: Mood is depressed.         Speech: Speech normal.         Behavior: Behavior is withdrawn.         Thought Content: Thought content includes suicidal ideation. Thought content includes suicidal plan.         Cognition and Memory: Cognition and memory normal.         Judgment: Judgment normal.           ED Course, Procedures, & Data      Procedures       Mental Health Risk Assessment        PSS-3      Date and Time Over the past 2 weeks have you felt down, depressed, or hopeless? Over the past 2 weeks have you had thoughts of killing yourself? Have you ever attempted to kill yourself? When did this last happen? User   11/18/23 1320 yes yes yes more than 6 months ago RMK          C-SSRS ([place])      Date and Time Q1 Wished to be Dead (Past Month) Q2 Suicidal Thoughts (Past Month) Q3 Suicidal Thought Method Q4 Suicidal Intent without Specific Plan Q5 Suicide Intent with Specific Plan Q6 Suicide Behavior (Lifetime) Within the Past 3 Months? RETIRED: Level of Risk per Screen Screening Not Complete User   11/18/23 1320 yes yes yes no no -- -- -- -- RMK                Suicide assessment completed by mental health (D.E.C., LCSW, etc.)       No results found for any visits on 11/18/23.  Medications   hydrOXYzine (ATARAX) tablet 25 mg (has no administration in time range)     Or   hydrOXYzine (ATARAX) tablet 50 mg (has no administration in time range)   ibuprofen (ADVIL/MOTRIN) tablet 400 mg (has no administration in time range)     Labs Ordered and Resulted from Time of ED Arrival to Time of ED Departure - No data to display  No orders to display          Critical care was not performed.     Medical Decision Making  The patient's  presentation was of high complexity (a chronic illness severe exacerbation, progression, or side effect of treatment).    The patient's evaluation involved:  review of external note(s) from 1 sources (admission note 10/5/17)  review of 1 test result(s) ordered prior to this encounter (hand xray 10/19/23)  discussion of management or test interpretation with another health professional (dec )    The patient's management necessitated high risk (a decision regarding hospitalization).    Assessment & Plan    Fito Daniel is a 53 year old male with hx of bipolar 1, borderline schizophrenia, MDD, PTSD, substance abuse who presents to the ED suicidal with a plan to overdose on fentanyl.  Hx of several prior attempts in the past, no providers, and unable to contract for safety.  Dec assessment done, case discussed and they recommend inpatient admission. I agree with this plan.  Voluntary admit. Would need reassessment if he wants to leave. Given ibuprofen for hand pain (chronic past month) and ibuprofen for pain.  Psychiatry consult requested.     I have reviewed the nursing notes. I have reviewed the findings, diagnosis, plan and need for follow up with the patient.    New Prescriptions    No medications on file       Final diagnoses:   Suicidal ideation   Opioid use disorder       Luisa Barbosa MD  [facility] EMERGENCY DEPARTMENT  11/18/2023     Luisa Barbosa MD  11/18/23 3738

## 2023-11-18 NOTE — ED NOTES
IP MH Referral Acuity Rating Score (RARS)    LMHP complete at referral to IP MH, with DEC; and, daily while awaiting IP MH placement. Call score to PPS.  CRITERIA SCORING   New 72 HH and Involuntary for IP MH (not adolescent) 0/1   Boarding over 24 hours 0/1   Vulnerable adult at least 55+ with multiple co morbidities; or, Patient age 11 or under 0/1   Suicide ideation without relief of precipitating factors 1/1   Current plan for suicide 1/1   Current plan for homicide 0/1   Imminent risk or actual attempt to seriously harm another without relief of factors precipitating the attempt 1/1   Severe dysfunction in daily living (ex: complete neglect for self care, extreme disruption in vegetative function, extreme deterioration in social interactions) 0/1   Recent (last 2 weeks) or current physical aggression in the ED 0/1   Restraints or seclusion episode in ED 0/1   Verbal aggression, agitation, yelling, etc., while in the ED 0/1   Active psychosis with psychomotor agitation or catatonia 0/1   Need for constant or near constant redirection (from leaving, from others, etc).  0/1   Intrusive or disruptive behaviors 0/1   TOTAL Acuity Total Score: 3
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
## 2023-11-18 NOTE — TELEPHONE ENCOUNTER
S: [facility] ED DEC  Alicia  calling at 2:37 pm about a 53 year old/Male presenting with SI with plan to od.      B: Pt arrived via  his advocate . Presenting problem, stressors:  SI with plan to od. He admits to using fentanyl last night. He says he can't identify a current stressor. He says he started going through withdrawal last night and started feeling suicidal at that point. He gives conflicting info, as he says he was in withdrawal, but he denies regular opiate use.His advocate says he uses opiates regularly, unk amt, unk freq.  Refuses to safety plan. He is homeless and lives in an encampment.     Pt affect in ED: Disengaged   Pt Dx: ADHD, borderline schizophrenia, bipolar I, dep d/o, ptsd, substance use d/o  Previous IPMH hx? Yes: Oct 2015  Pt endorses SI with a plan to od.     Hx of suicide attempt? Yes: hx of 12 od's as SA's  Pt denies SIB  Pt denies HI   Pt denies hallucinations .   Pt RARS Score: 3    Hx of aggression/violence, sexual offenses, legal concerns, Epic care plan? describe: no  Current concerns for aggression this visit? No  Does pt have a history of Civil Commitment? No  Is Pt their own guardian? Yes    Pt is prescribed medication. Is patient medication compliant? No; off meds for 3 years.   Pt endorses OP services: [facility]  and an advocate through the [facility]   CD concerns: He admits to using fentanyl last night. He says he started going through withdrawal last night. He gives conflicting info, as he says he was in withdrawal, but he denies regular opiate use.His advocate says he uses opiates regularly, unk amt, unk freq; he also says he abuses other pain meds at times but denies regular use; last cd tx was in 2015 at [facility]  Acute or chronic medical concerns: no  Does Pt present with specific needs, assistive devices, or exclusionary criteria? None      Pt is ambulatory  Pt is able to perform ADLs independently      A: Pt to be reviewed for [facility] admission. Pt is  Voluntary  Preferred placement: Statewide    COVID Symptoms: No  If yes, COVID test required   Utox: Ordered, not yet collected   CMP: n/a  CBC: n/a  HCG: N/A    R: Patient cleared and ready for behavioral bed placement: Yes  Pt placed on IP worklist? Yes    Does Patient need a Transfer Center request created? No, Pt is located within [facility] ED, [facility] ED, or [facility] ED

## 2023-11-18 NOTE — ED NOTES
Pt resting on cart. He is tearful. Pt does not offer much to conversation. Pt advocate is at bedside. Pt is calm and cooperative currently. Pt was changed into scrubs. 1:1 initiated. Hx of bipolar & schizophrenia, pt states he has not taken any of his meds.

## 2023-11-19 ENCOUNTER — TELEPHONE (OUTPATIENT)
Dept: BEHAVIORAL HEALTH | Facility: CLINIC | Age: 53
End: 2023-11-19

## 2023-11-19 VITALS
OXYGEN SATURATION: 100 % | BODY MASS INDEX: 27.11 KG/M2 | WEIGHT: 189.4 LBS | TEMPERATURE: 98.6 F | RESPIRATION RATE: 16 BRPM | DIASTOLIC BLOOD PRESSURE: 64 MMHG | HEART RATE: 66 BPM | HEIGHT: 70 IN | SYSTOLIC BLOOD PRESSURE: 120 MMHG

## 2023-11-19 LAB
ALBUMIN SERPL BCG-MCNC: 3.8 G/DL (ref 3.5–5.2)
ALBUMIN UR-MCNC: NEGATIVE MG/DL
ALP SERPL-CCNC: 93 U/L (ref 40–150)
ALT SERPL W P-5'-P-CCNC: 24 U/L (ref 0–70)
AMPHETAMINES UR QL SCN: ABNORMAL
ANION GAP SERPL CALCULATED.3IONS-SCNC: 8 MMOL/L (ref 7–15)
APAP SERPL-MCNC: <5 UG/ML (ref 10–30)
APPEARANCE UR: CLEAR
AST SERPL W P-5'-P-CCNC: 21 U/L (ref 0–45)
BARBITURATES UR QL SCN: ABNORMAL
BASOPHILS # BLD AUTO: 0 10E3/UL (ref 0–0.2)
BASOPHILS NFR BLD AUTO: 1 %
BENZODIAZ UR QL SCN: ABNORMAL
BILIRUB SERPL-MCNC: 0.4 MG/DL
BILIRUB UR QL STRIP: NEGATIVE
BUN SERPL-MCNC: 15.2 MG/DL (ref 6–20)
BZE UR QL SCN: ABNORMAL
CALCIUM SERPL-MCNC: 9 MG/DL (ref 8.6–10)
CANNABINOIDS UR QL SCN: ABNORMAL
CHLORIDE SERPL-SCNC: 106 MMOL/L (ref 98–107)
COLOR UR AUTO: YELLOW
CREAT SERPL-MCNC: 0.66 MG/DL (ref 0.67–1.17)
DEPRECATED HCO3 PLAS-SCNC: 26 MMOL/L (ref 22–29)
EGFRCR SERPLBLD CKD-EPI 2021: >90 ML/MIN/1.73M2
EOSINOPHIL # BLD AUTO: 0 10E3/UL (ref 0–0.7)
EOSINOPHIL NFR BLD AUTO: 0 %
ERYTHROCYTE [DISTWIDTH] IN BLOOD BY AUTOMATED COUNT: 14.7 % (ref 10–15)
ETHANOL SERPL-MCNC: <0.01 G/DL
FENTANYL UR QL: ABNORMAL
GLUCOSE SERPL-MCNC: 119 MG/DL (ref 70–99)
GLUCOSE UR STRIP-MCNC: NEGATIVE MG/DL
HCT VFR BLD AUTO: 38.9 % (ref 40–53)
HGB BLD-MCNC: 12.7 G/DL (ref 13.3–17.7)
HGB UR QL STRIP: NEGATIVE
IMM GRANULOCYTES # BLD: 0 10E3/UL
IMM GRANULOCYTES NFR BLD: 0 %
KETONES UR STRIP-MCNC: NEGATIVE MG/DL
LEUKOCYTE ESTERASE UR QL STRIP: NEGATIVE
LYMPHOCYTES # BLD AUTO: 1.3 10E3/UL (ref 0.8–5.3)
LYMPHOCYTES NFR BLD AUTO: 20 %
MCH RBC QN AUTO: 26.6 PG (ref 26.5–33)
MCHC RBC AUTO-ENTMCNC: 32.6 G/DL (ref 31.5–36.5)
MCV RBC AUTO: 81 FL (ref 78–100)
MONOCYTES # BLD AUTO: 0.4 10E3/UL (ref 0–1.3)
MONOCYTES NFR BLD AUTO: 6 %
MUCOUS THREADS #/AREA URNS LPF: PRESENT /LPF
NEUTROPHILS # BLD AUTO: 4.9 10E3/UL (ref 1.6–8.3)
NEUTROPHILS NFR BLD AUTO: 73 %
NITRATE UR QL: NEGATIVE
NRBC # BLD AUTO: 0 10E3/UL
NRBC BLD AUTO-RTO: 0 /100
OPIATES UR QL SCN: ABNORMAL
PCP QUAL URINE (ROCHE): ABNORMAL
PH UR STRIP: 7 [PH] (ref 5–7)
PLATELET # BLD AUTO: 361 10E3/UL (ref 150–450)
POTASSIUM SERPL-SCNC: 4.1 MMOL/L (ref 3.4–5.3)
PROT SERPL-MCNC: 7.1 G/DL (ref 6.4–8.3)
RBC # BLD AUTO: 4.78 10E6/UL (ref 4.4–5.9)
RBC URINE: 1 /HPF
SALICYLATES SERPL-MCNC: <0.3 MG/DL
SARS-COV-2 RNA RESP QL NAA+PROBE: NEGATIVE
SODIUM SERPL-SCNC: 140 MMOL/L (ref 135–145)
SP GR UR STRIP: 1.02 (ref 1–1.03)
SQUAMOUS EPITHELIAL: 1 /HPF
UROBILINOGEN UR STRIP-MCNC: NORMAL MG/DL
WBC # BLD AUTO: 6.7 10E3/UL (ref 4–11)
WBC URINE: 1 /HPF

## 2023-11-19 PROCEDURE — 87389 HIV-1 AG W/HIV-1&-2 AB AG IA: CPT

## 2023-11-19 PROCEDURE — 82077 ASSAY SPEC XCP UR&BREATH IA: CPT | Performed by: EMERGENCY MEDICINE

## 2023-11-19 PROCEDURE — 80143 DRUG ASSAY ACETAMINOPHEN: CPT | Performed by: EMERGENCY MEDICINE

## 2023-11-19 PROCEDURE — 36415 COLL VENOUS BLD VENIPUNCTURE: CPT

## 2023-11-19 PROCEDURE — 99417 PROLNG OP E/M EACH 15 MIN: CPT

## 2023-11-19 PROCEDURE — 250N000013 HC RX MED GY IP 250 OP 250 PS 637

## 2023-11-19 PROCEDURE — 86706 HEP B SURFACE ANTIBODY: CPT

## 2023-11-19 PROCEDURE — 86704 HEP B CORE ANTIBODY TOTAL: CPT

## 2023-11-19 PROCEDURE — 82947 ASSAY GLUCOSE BLOOD QUANT: CPT | Performed by: EMERGENCY MEDICINE

## 2023-11-19 PROCEDURE — 85025 COMPLETE CBC W/AUTO DIFF WBC: CPT | Performed by: EMERGENCY MEDICINE

## 2023-11-19 PROCEDURE — 80179 DRUG ASSAY SALICYLATE: CPT | Performed by: EMERGENCY MEDICINE

## 2023-11-19 PROCEDURE — 99245 OFF/OP CONSLTJ NEW/EST HI 55: CPT

## 2023-11-19 PROCEDURE — 36415 COLL VENOUS BLD VENIPUNCTURE: CPT | Performed by: EMERGENCY MEDICINE

## 2023-11-19 PROCEDURE — 250N000013 HC RX MED GY IP 250 OP 250 PS 637: Performed by: EMERGENCY MEDICINE

## 2023-11-19 PROCEDURE — 87340 HEPATITIS B SURFACE AG IA: CPT

## 2023-11-19 RX ORDER — BUPRENORPHINE AND NALOXONE 8; 2 MG/1; MG/1
1 FILM, SOLUBLE BUCCAL; SUBLINGUAL EVERY 24 HOURS
Status: DISCONTINUED | OUTPATIENT
Start: 2023-11-20 | End: 2023-11-19

## 2023-11-19 RX ORDER — POLYETHYLENE GLYCOL 3350 17 G/17G
17 POWDER, FOR SOLUTION ORAL DAILY PRN
Status: DISCONTINUED | OUTPATIENT
Start: 2023-11-19 | End: 2023-11-20 | Stop reason: HOSPADM

## 2023-11-19 RX ORDER — LOPERAMIDE HCL 2 MG
2 CAPSULE ORAL EVERY 4 HOURS PRN
Status: DISCONTINUED | OUTPATIENT
Start: 2023-11-19 | End: 2023-11-20 | Stop reason: HOSPADM

## 2023-11-19 RX ORDER — GABAPENTIN 100 MG/1
200 CAPSULE ORAL 3 TIMES DAILY PRN
Status: DISCONTINUED | OUTPATIENT
Start: 2023-11-19 | End: 2023-11-20 | Stop reason: HOSPADM

## 2023-11-19 RX ORDER — ONDANSETRON 4 MG/1
4 TABLET, ORALLY DISINTEGRATING ORAL EVERY 6 HOURS PRN
Status: DISCONTINUED | OUTPATIENT
Start: 2023-11-19 | End: 2023-11-20 | Stop reason: HOSPADM

## 2023-11-19 RX ORDER — BUPRENORPHINE AND NALOXONE 4; 1 MG/1; MG/1
1 FILM, SOLUBLE BUCCAL; SUBLINGUAL 2 TIMES DAILY
Status: DISCONTINUED | OUTPATIENT
Start: 2023-11-19 | End: 2023-11-20 | Stop reason: HOSPADM

## 2023-11-19 RX ORDER — CLONIDINE HYDROCHLORIDE 0.1 MG/1
0.1 TABLET ORAL EVERY 6 HOURS PRN
Status: DISCONTINUED | OUTPATIENT
Start: 2023-11-19 | End: 2023-11-20 | Stop reason: HOSPADM

## 2023-11-19 RX ORDER — BUPRENORPHINE AND NALOXONE 2; .5 MG/1; MG/1
1 FILM, SOLUBLE BUCCAL; SUBLINGUAL
Status: DISCONTINUED | OUTPATIENT
Start: 2023-11-19 | End: 2023-11-20 | Stop reason: HOSPADM

## 2023-11-19 RX ADMIN — TRAZODONE HYDROCHLORIDE 50 MG: 50 TABLET ORAL at 01:47

## 2023-11-19 RX ADMIN — HYDROXYZINE HYDROCHLORIDE 25 MG: 25 TABLET ORAL at 18:04

## 2023-11-19 RX ADMIN — SERTRALINE HYDROCHLORIDE 50 MG: 50 TABLET ORAL at 10:55

## 2023-11-19 RX ADMIN — HYDROXYZINE HYDROCHLORIDE 25 MG: 25 TABLET ORAL at 01:48

## 2023-11-19 RX ADMIN — HYDROXYZINE HYDROCHLORIDE 50 MG: 50 TABLET, FILM COATED ORAL at 07:36

## 2023-11-19 RX ADMIN — BUPRENORPHINE AND NALOXONE 1 FILM: 4; 1 FILM, SOLUBLE BUCCAL; SUBLINGUAL at 21:39

## 2023-11-19 RX ADMIN — BUPRENORPHINE HYDROCHLORIDE, NALOXONE HYDROCHLORIDE 1 FILM: 2; .5 FILM, SOLUBLE BUCCAL; SUBLINGUAL at 18:04

## 2023-11-19 RX ADMIN — BUPRENORPHINE AND NALOXONE 1 FILM: 4; 1 FILM, SOLUBLE BUCCAL; SUBLINGUAL at 10:55

## 2023-11-19 ASSESSMENT — ACTIVITIES OF DAILY LIVING (ADL)
ADLS_ACUITY_SCORE: 35

## 2023-11-19 ASSESSMENT — LIFESTYLE VARIABLES
TOTAL_SCORE: 7
TOTAL_SCORE: 5

## 2023-11-19 NOTE — TELEPHONE ENCOUNTER
R: [place]  Access Inpatient Bed Call Log 11/19/23  @ 6:15pm    Intake has called facilities that have not updated the bed status within the last 12 hours.          Statewide:                          [facility] is posting 0 beds.       [facility] is posting 0 beds. [phone].    [facility] is posting 0 beds. Negative covid required.                 [facility] is posting 0 beds. Negative covid required. [phone].    [facility] is posting 0 beds. [phone]   [facility] is posting 0 beds. [phone].    [facility] is posting 2 beds for Young Adults age 18-26. Negative covid.  [phone].    [facility] is posting 0 beds           [facility] is posting 0 beds.       [facility] is posting 2 beds. LOW acuity ONLY. Mixed unit 12+. Negative covid- [phone].   6:18pm- can review. Fax Tylenol, Sacilatate, alcohol, COVID, CBC, CMP, UA, Medlist, RN & ED notes & DEC.  [phone] .  Requested Tylenol, Salicylate, UA, and alcohol. Faxed clinical and available labs.  Pending response.      9:42pm- Shabana from [facility] reports they are accepting Pt for placement.  Admitting/attending provider is Dr. Lyn.  # for report is [phone].      Tylenol and Salicylate results faxed to [facility].  UA result faxed as well.     Placement given to ED RN.

## 2023-11-19 NOTE — TELEPHONE ENCOUNTER
01:12 - [place] CRN declined d/t concerns of opiate withdrawal.  01:13 - Paged on call resident to review for shared bed on 20NB  01:19 - Resident called back and reports that bed is not available d/t the pt that is already in room has a no-roommate order. [facility] is at capacity  02:02 - Called ED MD to request labs for outside hospitals to review    UDS is collected/in process    Bed search update @ 12AM:          [facility]: @ cap per website. Per Madelin @ 00:04, they are full    [facility]: @ cap per website   [facility]: @ cap per website. Per Luisa @ 00:07, they can only review in the morning   [facility]: @ cap per website   [facility]: @ cap per website   [facility]: Posting 2 beds (Young Adult unit: No recent aggressions, violence or sexual assault. Neg covid required).  Pt does not meet age requirement   [facility]: @ cap per website   [facility]: @ cap per website   [facility]: @ cap per website   [facility]: Posting 2 beds. Mixed unit/Low acuity only. Requires labs and UDS for review  [facility]: @ cap per website   [facility]: @ cap per website   [facility]: @ cap per website   [facility]: Does not review after 10PM.     [facility]: Posting beds in [place] and [place]. Per [place] @ 00:12: Voluntary/Low acuity beds in [place]; Possible bed in [place]; Low acuity beds in [place] - Requires labs and UDS for review   [facility]: Posting 5 beds (No hx of aggression. No sexual offenders. Voluntary patients only). Requires labs and UDS for [facility]: Posting 5 beds (Always low acuity only. Must have the cognitive ability to do programming. No aggressive or violent behavior or recent HX in the last 2 yrs. MH must be primary). Requires labs and UDS for review  [facility]: @ cap per website   [facility]: @ cap per website  [facility]: Posting 7 beds (Covid neg. Voluntary only. Mixed unit. No  aggressive or violent behavior. No registered sex offenders). Requires labs and UDS for review  [facility]: @ cap per website   [facility]: Posting 8 beds. Facility is in [place]. Pt requesting statewide for placement at this time  [facility]: Posting 3 beds (Mixed unit/Low acuity) .Requires labs and UDS for review        Pt remains on work list until appropriate placement is available

## 2023-11-19 NOTE — TELEPHONE ENCOUNTER
R: [place]  Access Inpatient Bed Call Log 11/19/23  @12:22 pm:   Intake can search *STATEWIDE for placement:    No available beds within the  system.    [place] -- [facility]:  @ cap per website.  [place] -- [facility]: @cap per website  [place] -- [facility]: @ cap per website.  [place] -- [facility]: @cap per website.  [facility]: @ cap per website.  [place] -- [facility]/YA beds - 2 beds for Young Adults age 18-26.  [place] -- [facility]: @ cap per website.  [place] -- [facility]: @ cap per website.  [place] -- [facility]:  @ [facility]: 2 beds available. - Mixed unit/Low acuity only.  [facility]: 0 Beds avaliable  Low acuity, No aggression  [facility]: @ cap per website  [facility]:  @ capacity  Low acuity only. No current aggression.  [facility]:  1 bed COVID negative test req. Lower acuity only  [facility]: 2 beds available. Low acuity only. Prefer med adjustments placement.  [facility]: 3 beds. No aggression  [place] -- [facility]: 1 Bed  Low acuity only.   [place] -- [facility]: 5 beds available  No hx of aggression. No sexual offenders. Voluntary patients only.  [facility]:  Posting 5 beds. Low acuity only. Must have the cognitive ability to do programming. No aggressive or violent behavior or recent HX in the last 2 yrs.  must be primary.  [place] -- [facility], [facility]: 0 Beds avaliable.  COVID negative test. Must be low acuity ONLY.  [facility]: NO Beds. Low acuity. Negative Covid.  [place] -- [facility]: 7 open beds neg. Voluntary only. Mixed unit of adults & adol. No aggressive or violent behavior. No registered sex offenders.   [place] -- [facility]: 3 Beds available. COVID negative test  [place] -[facility]: NO Beds    No hx of aggression/assault. No lines, drains or tubes. Does not provide detox or  CD treatment.  [place] -- [facility]: Posting 8 beds.   Out-of-State Facility.  [place] -- [facility]: Posting 3 beds. Mixed unit/Low acuity/no medical devices - IV, CPAP etc.     Pt remains on waitlist pending appropriate placement availability.
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
## 2023-11-19 NOTE — TELEPHONE ENCOUNTER
R: [place]  Access Inpatient Bed Call Log 11/18/23  @3:12 PM:   Intake has called facilities that have not updated the bed status within the last 12 hours.                               [facility] is posting 0 beds.       [facility] is posting 0 beds. [phone]. Per call at 7:13 am to Rosalina, they are at cap.    [facility] is posting 0 beds. Negative covid required.                 [facility] is posting 0 bed. Negative covid required. [phone]. Per call at 7:14 am to Ruth, we can call back later.   [facility] is posting 0 beds. [phone]   [facility] is posting 0 beds. [phone].    [facility] is posting 2 beds for Young Adults age 18-26. Call for details. Negative covid.  [phone]. Per call at 7:16 am to Lois, they have 2 beds avail.  Pt is too old  [facility] is posting 0 beds           [facility] ([facility]) is posting 0 beds.       [facility] is posting 1 beds. LOW acuity ONLY. Mixed unit 12+. Negative covid- [phone].     [facility] has 0 bed posted. No aggression. Negative Covid. Low acuity.    [facility] is posting 0 beds. Negative covid. [phone] per call at 7:21 am, vm said not to leave a message; no option to speak with a live rep.   [facility] ([place]) is posting 2 beds. Low acuity only. Negative covid.  [phone].    [facility] is posting 0 bed. Low acuity. No current aggression. [phone]   [facility] ([place]) is posting 4 beds available. Negative covid.  [phone].       [facility] is posting 0 beds. Low acuity. 72 HH hold preferred. Negative covid required. [phone]; per call at 7:24 am to Delilah they do have beds avail, specific number unk.    [facility] ([place]) is posting 3 beds. Low acuity only. Negative covid.  [phone].    [facility] in [place] is posting 6 beds.  Negative covid required.   Vol only, No history  of aggression, violence, or assault. No sexual offenders. No 72 HH holds. [phone].         [facility] is posting 5 beds. Negative covid required.  (Must have the cognitive ability to do programming. No aggressive or violent behavior or recent HX in the last 2 yrs. MH must be primary.) Always low acuity. [phone]    [facility] has 0 beds posted. Negative covid required.  Low acuity only. Violence and aggression capped.  [phone]. Low acuity only.    [facility] is posting 0 beds. Low acuity, Negative covid required. [phone] per call at 7:27 am to [facility], they are at cap but we can call back later.    [facility] is posting 7 beds. Unit is a combined unit (14+). No aggressive patients. Voluntary only. Must be accompanied by a guardian.  Negative covid. [phone].  per call at 7:29 am to [facility], they have 1 bed avail.    [facility] is [facility] 2 beds Negative covid required.  [phone] per Barbara's call to [facility] at 7 am, they have 3 step-down/low acuity beds avail.  Per Call with Kristel @10:16 PM beds available and they will review Pt  [facility] 0 beds. Negative covid. LOW acuity. (No lines, drains, or tubes, oxygen, CPAP, IV, etc.). [phone]    [facility] is posting 8 beds. No covid test required.  [phone] per call at 7:34 am to [facility], they have 10 adult beds and 14 child/adol beds avail.    [facility] ([facility]) is posting 3 beds. Negative covid. (No. lines, drains, or tubes, oxygen, CPAP, IV, etc.). [phone] Per call @ 7:36 am to [facility], they have 3 beds avail, no aggression or ines.        Pt remains on the work list pending appropriate bed availability.         10:28 PM Called [facility] ED and asked EMMY Singletary RN to get Covid lab.  Intake stated [facility] is looking at Pt and to confirm with Pt that he is really open to statewide

## 2023-11-20 LAB
HBV CORE AB SERPL QL IA: NONREACTIVE
HBV SURFACE AB SERPL IA-ACNC: 0.39 M[IU]/ML
HBV SURFACE AB SERPL IA-ACNC: NONREACTIVE M[IU]/ML
HBV SURFACE AG SERPL QL IA: NONREACTIVE
HIV 1+2 AB+HIV1 P24 AG SERPL QL IA: NONREACTIVE

## 2023-11-20 NOTE — ED NOTES
Received pt with SI and hand pain, pt is in fraser on bed resting, he is AOX3, NAD, safety maintained, pt denies anything at this time, will cont to monitor.

## 2023-11-20 NOTE — ED NOTES
Pt is transfer from the ED to [facility] via EMS, AOX3, NAD, VSS, report given, pt left ED in stable condition.

## 2023-12-11 ENCOUNTER — HOSPITAL ENCOUNTER (OUTPATIENT)
Age: 53
End: 2023-12-11
Payer: COMMERCIAL

## 2024-01-17 ENCOUNTER — TRANSCRIBE ORDERS (OUTPATIENT)
Dept: OTHER | Age: 54
End: 2024-01-17

## 2024-01-17 DIAGNOSIS — B17.9 ACUTE HEPATITIS: Primary | ICD-10-CM

## 2024-01-20 ENCOUNTER — APPOINTMENT (OUTPATIENT)
Dept: GENERAL RADIOLOGY | Facility: CLINIC | Age: 54
End: 2024-01-20
Attending: EMERGENCY MEDICINE
Payer: COMMERCIAL

## 2024-01-20 ENCOUNTER — HOSPITAL ENCOUNTER (EMERGENCY)
Facility: CLINIC | Age: 54
Discharge: HOME | End: 2024-01-20
Attending: EMERGENCY MEDICINE | Admitting: EMERGENCY MEDICINE
Payer: COMMERCIAL

## 2024-01-20 VITALS
SYSTOLIC BLOOD PRESSURE: 97 MMHG | DIASTOLIC BLOOD PRESSURE: 55 MMHG | TEMPERATURE: 99.6 F | RESPIRATION RATE: 18 BRPM | OXYGEN SATURATION: 96 % | HEART RATE: 102 BPM

## 2024-01-20 DIAGNOSIS — R06.00 DYSPNEA, UNSPECIFIED TYPE: ICD-10-CM

## 2024-01-20 DIAGNOSIS — J45.901 MILD ASTHMA WITH ACUTE EXACERBATION, UNSPECIFIED WHETHER PERSISTENT: ICD-10-CM

## 2024-01-20 LAB
ALBUMIN SERPL BCG-MCNC: 3.5 G/DL (ref 3.5–5.2)
ALP SERPL-CCNC: 128 U/L (ref 40–150)
ALT SERPL W P-5'-P-CCNC: 122 U/L (ref 0–70)
ANION GAP SERPL CALCULATED.3IONS-SCNC: 10 MMOL/L (ref 7–15)
AST SERPL W P-5'-P-CCNC: 196 U/L (ref 0–45)
BASOPHILS # BLD AUTO: 0 10E3/UL (ref 0–0.2)
BASOPHILS NFR BLD AUTO: 0 %
BILIRUB SERPL-MCNC: 2.3 MG/DL
BUN SERPL-MCNC: 27.8 MG/DL (ref 6–20)
CALCIUM SERPL-MCNC: 8.3 MG/DL (ref 8.6–10)
CHLORIDE SERPL-SCNC: 97 MMOL/L (ref 98–107)
CREAT SERPL-MCNC: 0.89 MG/DL (ref 0.67–1.17)
DEPRECATED HCO3 PLAS-SCNC: 23 MMOL/L (ref 22–29)
EGFRCR SERPLBLD CKD-EPI 2021: >90 ML/MIN/1.73M2
EOSINOPHIL # BLD AUTO: 0 10E3/UL (ref 0–0.7)
EOSINOPHIL NFR BLD AUTO: 0 %
ERYTHROCYTE [DISTWIDTH] IN BLOOD BY AUTOMATED COUNT: 18.6 % (ref 10–15)
FLUAV RNA SPEC QL NAA+PROBE: NEGATIVE
FLUBV RNA RESP QL NAA+PROBE: NEGATIVE
GLUCOSE SERPL-MCNC: 107 MG/DL (ref 70–99)
HCT VFR BLD AUTO: 38 % (ref 40–53)
HGB BLD-MCNC: 12.9 G/DL (ref 13.3–17.7)
IMM GRANULOCYTES # BLD: 0 10E3/UL
IMM GRANULOCYTES NFR BLD: 0 %
LYMPHOCYTES # BLD AUTO: 1 10E3/UL (ref 0.8–5.3)
LYMPHOCYTES NFR BLD AUTO: 9 %
MCH RBC QN AUTO: 28.5 PG (ref 26.5–33)
MCHC RBC AUTO-ENTMCNC: 33.9 G/DL (ref 31.5–36.5)
MCV RBC AUTO: 84 FL (ref 78–100)
MONOCYTES # BLD AUTO: 1.3 10E3/UL (ref 0–1.3)
MONOCYTES NFR BLD AUTO: 12 %
NEUTROPHILS # BLD AUTO: 8.4 10E3/UL (ref 1.6–8.3)
NEUTROPHILS NFR BLD AUTO: 79 %
NRBC # BLD AUTO: 0 10E3/UL
NRBC BLD AUTO-RTO: 0 /100
NT-PROBNP SERPL-MCNC: 335 PG/ML (ref 0–900)
PLATELET # BLD AUTO: 250 10E3/UL (ref 150–450)
POTASSIUM SERPL-SCNC: 4 MMOL/L (ref 3.4–5.3)
PROT SERPL-MCNC: 7.3 G/DL (ref 6.4–8.3)
RBC # BLD AUTO: 4.53 10E6/UL (ref 4.4–5.9)
RSV RNA SPEC NAA+PROBE: POSITIVE
SARS-COV-2 RNA RESP QL NAA+PROBE: NEGATIVE
SODIUM SERPL-SCNC: 130 MMOL/L (ref 135–145)
TROPONIN T SERPL HS-MCNC: 11 NG/L
WBC # BLD AUTO: 10.8 10E3/UL (ref 4–11)

## 2024-01-20 PROCEDURE — 250N000011 HC RX IP 250 OP 636: Performed by: EMERGENCY MEDICINE

## 2024-01-20 PROCEDURE — 99285 EMERGENCY DEPT VISIT HI MDM: CPT | Mod: 25 | Performed by: EMERGENCY MEDICINE

## 2024-01-20 PROCEDURE — 96361 HYDRATE IV INFUSION ADD-ON: CPT

## 2024-01-20 PROCEDURE — 93005 ELECTROCARDIOGRAM TRACING: CPT

## 2024-01-20 PROCEDURE — 87633 RESP VIRUS 12-25 TARGETS: CPT | Performed by: EMERGENCY MEDICINE

## 2024-01-20 PROCEDURE — 250N000013 HC RX MED GY IP 250 OP 250 PS 637: Performed by: EMERGENCY MEDICINE

## 2024-01-20 PROCEDURE — 96374 THER/PROPH/DIAG INJ IV PUSH: CPT

## 2024-01-20 PROCEDURE — 250N000009 HC RX 250: Performed by: EMERGENCY MEDICINE

## 2024-01-20 PROCEDURE — 87637 SARSCOV2&INF A&B&RSV AMP PRB: CPT | Performed by: EMERGENCY MEDICINE

## 2024-01-20 PROCEDURE — 84484 ASSAY OF TROPONIN QUANT: CPT | Performed by: EMERGENCY MEDICINE

## 2024-01-20 PROCEDURE — 83880 ASSAY OF NATRIURETIC PEPTIDE: CPT | Performed by: EMERGENCY MEDICINE

## 2024-01-20 PROCEDURE — 71046 X-RAY EXAM CHEST 2 VIEWS: CPT

## 2024-01-20 PROCEDURE — 94640 AIRWAY INHALATION TREATMENT: CPT

## 2024-01-20 PROCEDURE — 99285 EMERGENCY DEPT VISIT HI MDM: CPT | Mod: 25

## 2024-01-20 PROCEDURE — 93010 ELECTROCARDIOGRAM REPORT: CPT | Performed by: EMERGENCY MEDICINE

## 2024-01-20 PROCEDURE — 85025 COMPLETE CBC W/AUTO DIFF WBC: CPT | Performed by: EMERGENCY MEDICINE

## 2024-01-20 PROCEDURE — 96375 TX/PRO/DX INJ NEW DRUG ADDON: CPT

## 2024-01-20 PROCEDURE — 36415 COLL VENOUS BLD VENIPUNCTURE: CPT | Performed by: EMERGENCY MEDICINE

## 2024-01-20 PROCEDURE — 87581 M.PNEUMON DNA AMP PROBE: CPT | Performed by: EMERGENCY MEDICINE

## 2024-01-20 PROCEDURE — 80053 COMPREHEN METABOLIC PANEL: CPT | Performed by: EMERGENCY MEDICINE

## 2024-01-20 PROCEDURE — 258N000003 HC RX IP 258 OP 636: Performed by: EMERGENCY MEDICINE

## 2024-01-20 RX ORDER — KETOROLAC TROMETHAMINE 15 MG/ML
10 INJECTION, SOLUTION INTRAMUSCULAR; INTRAVENOUS ONCE
Status: COMPLETED | OUTPATIENT
Start: 2024-01-20 | End: 2024-01-20

## 2024-01-20 RX ORDER — BUPRENORPHINE AND NALOXONE 8; 2 MG/1; MG/1
1 FILM, SOLUBLE BUCCAL; SUBLINGUAL DAILY
Status: DISCONTINUED | OUTPATIENT
Start: 2024-01-21 | End: 2024-01-21 | Stop reason: HOSPADM

## 2024-01-20 RX ORDER — AZITHROMYCIN 250 MG/1
500 TABLET, FILM COATED ORAL ONCE
Status: COMPLETED | OUTPATIENT
Start: 2024-01-20 | End: 2024-01-20

## 2024-01-20 RX ORDER — PREDNISONE 20 MG/1
40 TABLET ORAL DAILY
Qty: 8 TABLET | Refills: 0 | Status: SHIPPED | OUTPATIENT
Start: 2024-01-20 | End: 2024-01-24

## 2024-01-20 RX ORDER — QUETIAPINE FUMARATE 50 MG/1
50 TABLET, FILM COATED ORAL
COMMUNITY
Start: 2023-12-29 | End: 2024-04-09

## 2024-01-20 RX ORDER — ALBUTEROL SULFATE 90 UG/1
2 AEROSOL, METERED RESPIRATORY (INHALATION) EVERY 6 HOURS PRN
Qty: 18 G | Refills: 0 | Status: SHIPPED | OUTPATIENT
Start: 2024-01-20 | End: 2024-04-09

## 2024-01-20 RX ORDER — METHYLPREDNISOLONE SODIUM SUCCINATE 125 MG/2ML
125 INJECTION, POWDER, LYOPHILIZED, FOR SOLUTION INTRAMUSCULAR; INTRAVENOUS ONCE
Status: COMPLETED | OUTPATIENT
Start: 2024-01-20 | End: 2024-01-20

## 2024-01-20 RX ORDER — AZITHROMYCIN 250 MG/1
TABLET, FILM COATED ORAL
Qty: 6 TABLET | Refills: 0 | Status: SHIPPED | OUTPATIENT
Start: 2024-01-20 | End: 2024-01-25

## 2024-01-20 RX ORDER — IPRATROPIUM BROMIDE AND ALBUTEROL SULFATE 2.5; .5 MG/3ML; MG/3ML
3 SOLUTION RESPIRATORY (INHALATION)
Status: DISCONTINUED | OUTPATIENT
Start: 2024-01-20 | End: 2024-01-21 | Stop reason: HOSPADM

## 2024-01-20 RX ADMIN — AZITHROMYCIN DIHYDRATE 500 MG: 250 TABLET ORAL at 22:21

## 2024-01-20 RX ADMIN — KETOROLAC TROMETHAMINE 10 MG: 15 INJECTION, SOLUTION INTRAMUSCULAR; INTRAVENOUS at 20:54

## 2024-01-20 RX ADMIN — SODIUM CHLORIDE, POTASSIUM CHLORIDE, SODIUM LACTATE AND CALCIUM CHLORIDE 1000 ML: 600; 310; 30; 20 INJECTION, SOLUTION INTRAVENOUS at 22:17

## 2024-01-20 RX ADMIN — SODIUM CHLORIDE, POTASSIUM CHLORIDE, SODIUM LACTATE AND CALCIUM CHLORIDE 1000 ML: 600; 310; 30; 20 INJECTION, SOLUTION INTRAVENOUS at 20:47

## 2024-01-20 RX ADMIN — IPRATROPIUM BROMIDE AND ALBUTEROL SULFATE 3 ML: .5; 3 SOLUTION RESPIRATORY (INHALATION) at 20:45

## 2024-01-20 RX ADMIN — METHYLPREDNISOLONE SODIUM SUCCINATE 125 MG: 125 INJECTION, POWDER, FOR SOLUTION INTRAMUSCULAR; INTRAVENOUS at 20:49

## 2024-01-20 ASSESSMENT — ENCOUNTER SYMPTOMS
COUGH: 1
SHORTNESS OF BREATH: 1

## 2024-01-20 ASSESSMENT — ACTIVITIES OF DAILY LIVING (ADL)
ADLS_ACUITY_SCORE: 35
ADLS_ACUITY_SCORE: 35

## 2024-01-21 ENCOUNTER — TELEPHONE (OUTPATIENT)
Dept: EMERGENCY MEDICINE | Facility: CLINIC | Age: 54
End: 2024-01-21
Payer: COMMERCIAL

## 2024-01-21 LAB
ATRIAL RATE - MUSE: 112 BPM
C PNEUM DNA SPEC QL NAA+PROBE: NOT DETECTED
DIASTOLIC BLOOD PRESSURE - MUSE: NORMAL MMHG
FLUAV H1 2009 PAND RNA SPEC QL NAA+PROBE: NOT DETECTED
FLUAV H1 RNA SPEC QL NAA+PROBE: NOT DETECTED
FLUAV H3 RNA SPEC QL NAA+PROBE: NOT DETECTED
FLUAV RNA SPEC QL NAA+PROBE: NOT DETECTED
FLUBV RNA SPEC QL NAA+PROBE: NOT DETECTED
HADV DNA SPEC QL NAA+PROBE: NOT DETECTED
HCOV PNL SPEC NAA+PROBE: NOT DETECTED
HMPV RNA SPEC QL NAA+PROBE: NOT DETECTED
HPIV1 RNA SPEC QL NAA+PROBE: NOT DETECTED
HPIV2 RNA SPEC QL NAA+PROBE: NOT DETECTED
HPIV3 RNA SPEC QL NAA+PROBE: NOT DETECTED
HPIV4 RNA SPEC QL NAA+PROBE: NOT DETECTED
INTERPRETATION ECG - MUSE: NORMAL
M PNEUMO DNA SPEC QL NAA+PROBE: NOT DETECTED
P AXIS - MUSE: 75 DEGREES
PR INTERVAL - MUSE: 150 MS
QRS DURATION - MUSE: 80 MS
QT - MUSE: 322 MS
QTC - MUSE: 439 MS
R AXIS - MUSE: 85 DEGREES
RSV RNA SPEC QL NAA+PROBE: DETECTED
RSV RNA SPEC QL NAA+PROBE: NOT DETECTED
RV+EV RNA SPEC QL NAA+PROBE: NOT DETECTED
SYSTOLIC BLOOD PRESSURE - MUSE: NORMAL MMHG
T AXIS - MUSE: 63 DEGREES
VENTRICULAR RATE- MUSE: 112 BPM

## 2024-01-21 NOTE — ED TRIAGE NOTES
[facility] (HPN. 448) Pt  sick for a week SOB, and  productive cough that is getting worse. VSS. EKG WNL.Pt c/o cold fingers. Blood sugar 163. No interventions by EMS         
[facility]

## 2024-01-21 NOTE — TELEPHONE ENCOUNTER
[facility] Emergency Department/Urgent Care Lab result notification  [Note:  ED Lab Results RN will reference the [facility] Emergency Dept visit note prior to contacting patient AND/OR prior to consulting Emergency Dept Provider.  Highlights of Emergency Dept visit in information summary at the bottom of this telephone note]    1. Reason for call  Notify of lab results  Assess patient symptoms [if necessary]  Review ED Providers recommendations/discharge instructions (if necessary)  Advise per [facility] ED lab result protocol    2. Lab Result (including Rx patient on, if applicable).  If culture, copy of lab report at bottom.  Final Respiratory Virus Panel by PCR is POSITIVE for RESPIRATORY SYNCYTIAL VIRUS  B (RSV)   Recommendations in treatment per [facility] ED lab result Respiratory Virus Panel or Influenza A/B antigen  protocol.    3. RN Assessment (Patient's current Symptoms):  Time of call: 1/21/2024 3:05 PM  Assessment: Left voicemail message requesting a call back to [facility] ED Lab Result RN at [phone].  RN is available every day between 9 a.m. and 5:30 p.m.  Sent letter    Sahil Roberts RN  [facility]  Emergency Dept Lab Result RN  Ph# [phone]

## 2024-01-21 NOTE — LETTER
January 21, 2024        Fito Daniel  [address]  [place] [address]          Dear Fito Daniel:    You were seen in the [facility] Emergency Department at [facility] EMERGENCY DEPARTMENT on 1/20/2024.  We are unable to reach you by phone, so we are sending you this letter.     It is important that you call [facility] Emergency Department lab result nurse at [phone], as we have information to relay to you AND/OR we MAY have to make some changes in your treatment.    Best time to call back is between 9AM and 5:30PM, 7 days a week.      Sincerely,     [facility] Emergency Department Lab Result RN  [phone]
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
## 2024-01-21 NOTE — DISCHARGE INSTRUCTIONS
Please call today to schedule a follow-up appointment with your primary medical doctor in 3-5 days for reevaluation, which is important after today's emergency department visit.  Please take antibiotics and steroids daily as prescribed.  You may use an inhaler as needed every 4-6 hours  Please call today to schedule a follow-up appointment with your primary medical doctor in 3-5 days for reevaluation, which is important after today's emergency department visit.   A referral has been placed on your behalf for primary doctor follow-up

## 2024-01-25 ENCOUNTER — HOSPITAL ENCOUNTER (EMERGENCY)
Facility: HOSPITAL | Age: 54
Discharge: HOME | End: 2024-01-25
Attending: EMERGENCY MEDICINE | Admitting: EMERGENCY MEDICINE
Payer: COMMERCIAL

## 2024-01-25 VITALS
DIASTOLIC BLOOD PRESSURE: 65 MMHG | BODY MASS INDEX: 28.63 KG/M2 | HEART RATE: 82 BPM | RESPIRATION RATE: 15 BRPM | WEIGHT: 200 LBS | HEIGHT: 70 IN | OXYGEN SATURATION: 95 % | TEMPERATURE: 98.1 F | SYSTOLIC BLOOD PRESSURE: 112 MMHG

## 2024-01-25 DIAGNOSIS — T40.601A OVERDOSE OF OPIATE OR RELATED NARCOTIC, ACCIDENTAL OR UNINTENTIONAL, INITIAL ENCOUNTER (H): ICD-10-CM

## 2024-01-25 DIAGNOSIS — F11.90 OPIOID USE DISORDER: ICD-10-CM

## 2024-01-25 LAB
ALBUMIN SERPL BCG-MCNC: 3 G/DL (ref 3.5–5.2)
ALP SERPL-CCNC: 111 U/L (ref 40–150)
ALT SERPL W P-5'-P-CCNC: 93 U/L (ref 0–70)
ANION GAP SERPL CALCULATED.3IONS-SCNC: 10 MMOL/L (ref 7–15)
AST SERPL W P-5'-P-CCNC: 65 U/L (ref 0–45)
BASOPHILS # BLD AUTO: 0.1 10E3/UL (ref 0–0.2)
BASOPHILS NFR BLD AUTO: 1 %
BILIRUB SERPL-MCNC: 1 MG/DL
BUN SERPL-MCNC: 22.6 MG/DL (ref 6–20)
CALCIUM SERPL-MCNC: 7.7 MG/DL (ref 8.6–10)
CHLORIDE SERPL-SCNC: 101 MMOL/L (ref 98–107)
CREAT SERPL-MCNC: 0.82 MG/DL (ref 0.67–1.17)
DEPRECATED HCO3 PLAS-SCNC: 22 MMOL/L (ref 22–29)
EGFRCR SERPLBLD CKD-EPI 2021: >90 ML/MIN/1.73M2
EOSINOPHIL # BLD AUTO: 0 10E3/UL (ref 0–0.7)
EOSINOPHIL NFR BLD AUTO: 0 %
ERYTHROCYTE [DISTWIDTH] IN BLOOD BY AUTOMATED COUNT: 19.4 % (ref 10–15)
GLUCOSE SERPL-MCNC: 147 MG/DL (ref 70–99)
HCT VFR BLD AUTO: 38.7 % (ref 40–53)
HGB BLD-MCNC: 13.1 G/DL (ref 13.3–17.7)
HOLD SPECIMEN: NORMAL
IMM GRANULOCYTES # BLD: 0.4 10E3/UL
IMM GRANULOCYTES NFR BLD: 4 %
LYMPHOCYTES # BLD AUTO: 0.9 10E3/UL (ref 0.8–5.3)
LYMPHOCYTES NFR BLD AUTO: 10 %
MAGNESIUM SERPL-MCNC: 2.1 MG/DL (ref 1.7–2.3)
MCH RBC QN AUTO: 28.7 PG (ref 26.5–33)
MCHC RBC AUTO-ENTMCNC: 33.9 G/DL (ref 31.5–36.5)
MCV RBC AUTO: 85 FL (ref 78–100)
MONOCYTES # BLD AUTO: 0.3 10E3/UL (ref 0–1.3)
MONOCYTES NFR BLD AUTO: 4 %
NEUTROPHILS # BLD AUTO: 7.5 10E3/UL (ref 1.6–8.3)
NEUTROPHILS NFR BLD AUTO: 81 %
NRBC # BLD AUTO: 0 10E3/UL
NRBC BLD AUTO-RTO: 0 /100
PLATELET # BLD AUTO: 307 10E3/UL (ref 150–450)
POTASSIUM SERPL-SCNC: 4.8 MMOL/L (ref 3.4–5.3)
PROT SERPL-MCNC: 6.6 G/DL (ref 6.4–8.3)
RBC # BLD AUTO: 4.57 10E6/UL (ref 4.4–5.9)
SODIUM SERPL-SCNC: 133 MMOL/L (ref 135–145)
TROPONIN T SERPL HS-MCNC: 8 NG/L
WBC # BLD AUTO: 9.2 10E3/UL (ref 4–11)

## 2024-01-25 PROCEDURE — 96361 HYDRATE IV INFUSION ADD-ON: CPT

## 2024-01-25 PROCEDURE — 36415 COLL VENOUS BLD VENIPUNCTURE: CPT | Performed by: STUDENT IN AN ORGANIZED HEALTH CARE EDUCATION/TRAINING PROGRAM

## 2024-01-25 PROCEDURE — 85025 COMPLETE CBC W/AUTO DIFF WBC: CPT | Performed by: EMERGENCY MEDICINE

## 2024-01-25 PROCEDURE — 83735 ASSAY OF MAGNESIUM: CPT | Performed by: EMERGENCY MEDICINE

## 2024-01-25 PROCEDURE — 84484 ASSAY OF TROPONIN QUANT: CPT | Performed by: EMERGENCY MEDICINE

## 2024-01-25 PROCEDURE — 80053 COMPREHEN METABOLIC PANEL: CPT | Performed by: EMERGENCY MEDICINE

## 2024-01-25 PROCEDURE — 96360 HYDRATION IV INFUSION INIT: CPT

## 2024-01-25 PROCEDURE — 99284 EMERGENCY DEPT VISIT MOD MDM: CPT | Mod: 25

## 2024-01-25 PROCEDURE — 36415 COLL VENOUS BLD VENIPUNCTURE: CPT | Performed by: EMERGENCY MEDICINE

## 2024-01-25 PROCEDURE — 258N000003 HC RX IP 258 OP 636: Performed by: EMERGENCY MEDICINE

## 2024-01-25 PROCEDURE — 93005 ELECTROCARDIOGRAM TRACING: CPT | Performed by: EMERGENCY MEDICINE

## 2024-01-25 RX ADMIN — SODIUM CHLORIDE 1000 ML: 9 INJECTION, SOLUTION INTRAVENOUS at 02:59

## 2024-01-25 ASSESSMENT — ENCOUNTER SYMPTOMS
NAUSEA: 0
SHORTNESS OF BREATH: 0

## 2024-01-25 ASSESSMENT — ACTIVITIES OF DAILY LIVING (ADL)
ADLS_ACUITY_SCORE: 35
ADLS_ACUITY_SCORE: 35

## 2024-01-25 NOTE — ED TRIAGE NOTES
Pt arrived via [facility]. Pt is from sober house and staff found him snoring with low respiration. Pt been sober for 40 days and injected fentenyl today. Pt received 2 nasal spray on each nostrils. VSS, and pt is alert and awake. Denies, cp, sob, and n/v.

## 2024-01-25 NOTE — ED NOTES
Bed: JNED-08  Expected date: 1/25/24  Expected time:   Means of arrival:   Comments:  Brionna  53 male  Fentanyl overdose, Narcan VSS
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
## 2024-01-31 NOTE — CONFIDENTIAL NOTE
DIAGNOSIS:  Acute hepatitis    Appt Date: 04.10.2024     NOTES STATUS DETAILS   OFFICE NOTE from referring provider Care Everywhere  12.11.2023 Tati Murray MD [facility]    OFFICE NOTES from other specialists     DISCHARGE SUMMARY from hospital Care Everywhere 12.11.2023 Tati Murray MD  [facility]    MEDICATION LIST Internal    LIVER BIOSPY (IF APPLICABLE)      PATHOLOGY REPORTS      IMAGING     ENDOSCOPY (IF AVAILABLE)     COLONOSCOPY (IF AVAILABLE)     ULTRASOUND LIVER Care Everywhere  12.11.2023 US Limited RUQ [facility]   CT OF ABDOMEN     MRI OF LIVER     FIBROSCAN, US ELASTOGRAPHY, FIBROSIS SCAN, MR ELASTOGRAPHY     LABS     HEPATIC PANEL (LIVER PANEL) Care Everywhere 01.22.2024   BASIC METABOLIC PANEL Care Everywhere 12.21.2023   COMPLETE METABOLIC PANEL Care Everywhere 01.22.2024   COMPLETE BLOOD COUNT (CBC) Care Everywhere 01.22.2024   INTERNATIONAL NORMALIZED RATIO (INR) Care Everywhere 12.15.2023   HEPATITIS C ANTIBODY     HEPATITIS C VIRAL LOAD/PCR     HEPATITIS C GENOTYPE     HEPATITIS B SURFACE ANTIGEN Internal 11.19.2023   HEPATITIS B SURFACE ANTIBODY Internal 11.19.2023   HEPATITIS B DNA QUANT LEVEL     HEPATITIS B CORE ANTIBODY Internal 11.19.2023     Action 01.31.2024 RM   Action Taken Pending records      Action 03.11.2024 RM   Action Taken Records in [place],  called [phone], was told to call Jose Called [phone] image received and uploaded to PACS

## 2024-02-03 LAB
ATRIAL RATE - MUSE: 68 BPM
DIASTOLIC BLOOD PRESSURE - MUSE: 61 MMHG
INTERPRETATION ECG - MUSE: NORMAL
P AXIS - MUSE: 67 DEGREES
PR INTERVAL - MUSE: 158 MS
QRS DURATION - MUSE: 84 MS
QT - MUSE: 416 MS
QTC - MUSE: 442 MS
R AXIS - MUSE: 69 DEGREES
SYSTOLIC BLOOD PRESSURE - MUSE: 102 MMHG
T AXIS - MUSE: 62 DEGREES
VENTRICULAR RATE- MUSE: 68 BPM

## 2024-04-05 ENCOUNTER — DOCUMENTATION ONLY (OUTPATIENT)
Dept: GASTROENTEROLOGY | Facility: CLINIC | Age: 54
End: 2024-04-05
Payer: COMMERCIAL

## 2024-04-05 DIAGNOSIS — Z11.59 ENCOUNTER FOR SCREENING FOR OTHER VIRAL DISEASES: ICD-10-CM

## 2024-04-05 DIAGNOSIS — R79.89 ELEVATED LFTS: Primary | ICD-10-CM

## 2024-04-07 DIAGNOSIS — B18.2 CHRONIC HEPATITIS C WITHOUT HEPATIC COMA (H): Primary | ICD-10-CM
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
## 2024-04-09 ENCOUNTER — OFFICE VISIT (OUTPATIENT)
Dept: FAMILY MEDICINE | Facility: CLINIC | Age: 54
End: 2024-04-09
Payer: COMMERCIAL

## 2024-04-09 VITALS
WEIGHT: 199.4 LBS | BODY MASS INDEX: 27.92 KG/M2 | SYSTOLIC BLOOD PRESSURE: 100 MMHG | DIASTOLIC BLOOD PRESSURE: 64 MMHG | HEIGHT: 71 IN | HEART RATE: 53 BPM | RESPIRATION RATE: 16 BRPM | TEMPERATURE: 97.5 F | OXYGEN SATURATION: 97 %

## 2024-04-09 DIAGNOSIS — J45.909 REACTIVE AIRWAY DISEASE WITHOUT COMPLICATION, UNSPECIFIED ASTHMA SEVERITY, UNSPECIFIED WHETHER PERSISTENT: ICD-10-CM

## 2024-04-09 DIAGNOSIS — Z02.89 ENCOUNTER FOR COMPLETION OF FORM WITH PATIENT: Primary | ICD-10-CM

## 2024-04-09 DIAGNOSIS — F33.41 RECURRENT MAJOR DEPRESSIVE DISORDER, IN PARTIAL REMISSION (H): ICD-10-CM

## 2024-04-09 PROCEDURE — 99204 OFFICE O/P NEW MOD 45 MIN: CPT | Mod: GC

## 2024-04-09 RX ORDER — ALBUTEROL SULFATE 90 UG/1
2 AEROSOL, METERED RESPIRATORY (INHALATION) EVERY 6 HOURS PRN
Qty: 18 G | Refills: 0 | Status: SHIPPED | OUTPATIENT
Start: 2024-04-09

## 2024-04-09 RX ORDER — BUPROPION HYDROCHLORIDE 150 MG/1
150 TABLET ORAL DAILY
Qty: 30 TABLET | Refills: 1 | Status: CANCELLED | OUTPATIENT
Start: 2024-04-09

## 2024-04-09 RX ORDER — QUETIAPINE FUMARATE 50 MG/1
100 TABLET, FILM COATED ORAL DAILY
Qty: 120 TABLET | Refills: 0 | Status: SHIPPED | OUTPATIENT
Start: 2024-04-09 | End: 2024-05-28

## 2024-04-09 ASSESSMENT — PATIENT HEALTH QUESTIONNAIRE - PHQ9: SUM OF ALL RESPONSES TO PHQ QUESTIONS 1-9: 13

## 2024-04-09 ASSESSMENT — ASTHMA QUESTIONNAIRES
QUESTION_2 LAST FOUR WEEKS HOW OFTEN HAVE YOU HAD SHORTNESS OF BREATH: ONCE OR TWICE A WEEK
EMERGENCY_ROOM_LAST_YEAR_TOTAL: ONE
QUESTION_4 LAST FOUR WEEKS HOW OFTEN HAVE YOU USED YOUR RESCUE INHALER OR NEBULIZER MEDICATION (SUCH AS ALBUTEROL): NOT AT ALL
QUESTION_3 LAST FOUR WEEKS HOW OFTEN DID YOUR ASTHMA SYMPTOMS (WHEEZING, COUGHING, SHORTNESS OF BREATH, CHEST TIGHTNESS OR PAIN) WAKE YOU UP AT NIGHT OR EARLIER THAN USUAL IN THE MORNING: ONCE A WEEK
ACT_TOTALSCORE: 17
ACT_TOTALSCORE: 17
QUESTION_5 LAST FOUR WEEKS HOW WOULD YOU RATE YOUR ASTHMA CONTROL: NOT CONTROLLED AT ALL
QUESTION_1 LAST FOUR WEEKS HOW MUCH OF THE TIME DID YOUR ASTHMA KEEP YOU FROM GETTING AS MUCH DONE AT WORK, SCHOOL OR AT HOME: A LITTLE OF THE TIME
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
## 2024-04-09 NOTE — PATIENT INSTRUCTIONS
Great to meet you today. We filled out a medical opinion form. Keep up the great work.     I've refilled your seroquel and albuterol, but let's follow-up in 1 week to continue discussing all of your medical needs.     Please also meet with a pharmacist to discuss your medications.

## 2024-04-10 ENCOUNTER — LAB (OUTPATIENT)
Dept: LAB | Facility: CLINIC | Age: 54
End: 2024-04-10
Attending: INTERNAL MEDICINE
Payer: COMMERCIAL

## 2024-04-10 ENCOUNTER — PRE VISIT (OUTPATIENT)
Dept: GASTROENTEROLOGY | Facility: CLINIC | Age: 54
End: 2024-04-10

## 2024-04-10 ENCOUNTER — OFFICE VISIT (OUTPATIENT)
Dept: GASTROENTEROLOGY | Facility: CLINIC | Age: 54
End: 2024-04-10
Attending: HOSPITALIST
Payer: COMMERCIAL

## 2024-04-10 VITALS
BODY MASS INDEX: 28.03 KG/M2 | OXYGEN SATURATION: 96 % | DIASTOLIC BLOOD PRESSURE: 71 MMHG | WEIGHT: 201 LBS | SYSTOLIC BLOOD PRESSURE: 106 MMHG | HEART RATE: 58 BPM

## 2024-04-10 DIAGNOSIS — B18.2 CHRONIC HEPATITIS C WITHOUT HEPATIC COMA (H): ICD-10-CM

## 2024-04-10 DIAGNOSIS — R79.89 ELEVATED LFTS: ICD-10-CM

## 2024-04-10 DIAGNOSIS — R73.03 PRE-DIABETES: ICD-10-CM

## 2024-04-10 DIAGNOSIS — R73.03 PRE-DIABETES: Primary | ICD-10-CM

## 2024-04-10 DIAGNOSIS — B17.9 ACUTE HEPATITIS: ICD-10-CM

## 2024-04-10 DIAGNOSIS — R94.5 ABNORMAL RESULTS OF LIVER FUNCTION STUDIES: ICD-10-CM

## 2024-04-10 DIAGNOSIS — Z11.59 ENCOUNTER FOR SCREENING FOR OTHER VIRAL DISEASES: ICD-10-CM

## 2024-04-10 DIAGNOSIS — F11.90 CHRONIC, CONTINUOUS USE OF OPIOIDS: ICD-10-CM

## 2024-04-10 LAB
AFP SERPL-MCNC: 4.5 NG/ML
ALBUMIN SERPL BCG-MCNC: 3.7 G/DL (ref 3.5–5.2)
ALP SERPL-CCNC: 122 U/L (ref 40–150)
ALT SERPL W P-5'-P-CCNC: 457 U/L (ref 0–70)
ANION GAP SERPL CALCULATED.3IONS-SCNC: 9 MMOL/L (ref 7–15)
AST SERPL W P-5'-P-CCNC: 178 U/L (ref 0–45)
BILIRUB DIRECT SERPL-MCNC: 0.31 MG/DL (ref 0–0.3)
BILIRUB SERPL-MCNC: 0.7 MG/DL
BUN SERPL-MCNC: 15.5 MG/DL (ref 6–20)
CALCIUM SERPL-MCNC: 8.9 MG/DL (ref 8.6–10)
CHLORIDE SERPL-SCNC: 104 MMOL/L (ref 98–107)
CK SERPL-CCNC: 73 U/L (ref 39–308)
CREAT SERPL-MCNC: 0.68 MG/DL (ref 0.67–1.17)
DEPRECATED HCO3 PLAS-SCNC: 26 MMOL/L (ref 22–29)
EGFRCR SERPLBLD CKD-EPI 2021: >90 ML/MIN/1.73M2
ERYTHROCYTE [DISTWIDTH] IN BLOOD BY AUTOMATED COUNT: 15.8 % (ref 10–15)
GLUCOSE SERPL-MCNC: 84 MG/DL (ref 70–99)
HAV AB SER QL IA: NONREACTIVE
HBA1C MFR BLD: 5.4 %
HBV CORE AB SERPL QL IA: NONREACTIVE
HBV SURFACE AB SERPL IA-ACNC: <3.5 M[IU]/ML
HBV SURFACE AB SERPL IA-ACNC: NONREACTIVE M[IU]/ML
HBV SURFACE AG SERPL QL IA: NONREACTIVE
HCT VFR BLD AUTO: 40.3 % (ref 40–53)
HCV AB SERPL QL IA: REACTIVE
HCV AB SERPL QL IA: REACTIVE
HGB BLD-MCNC: 13.3 G/DL (ref 13.3–17.7)
HIV 1+2 AB+HIV1 P24 AG SERPL QL IA: NONREACTIVE
INR PPP: 1.24 (ref 0.85–1.15)
MCH RBC QN AUTO: 28.7 PG (ref 26.5–33)
MCHC RBC AUTO-ENTMCNC: 33 G/DL (ref 31.5–36.5)
MCV RBC AUTO: 87 FL (ref 78–100)
PLATELET # BLD AUTO: 153 10E3/UL (ref 150–450)
POTASSIUM SERPL-SCNC: 4.2 MMOL/L (ref 3.4–5.3)
PROT SERPL-MCNC: 7.4 G/DL (ref 6.4–8.3)
RBC # BLD AUTO: 4.64 10E6/UL (ref 4.4–5.9)
SODIUM SERPL-SCNC: 139 MMOL/L (ref 135–145)
WBC # BLD AUTO: 4.3 10E3/UL (ref 4–11)

## 2024-04-10 PROCEDURE — 99204 OFFICE O/P NEW MOD 45 MIN: CPT | Performed by: INTERNAL MEDICINE

## 2024-04-10 PROCEDURE — 85610 PROTHROMBIN TIME: CPT | Performed by: PATHOLOGY

## 2024-04-10 PROCEDURE — 86803 HEPATITIS C AB TEST: CPT | Performed by: INTERNAL MEDICINE

## 2024-04-10 PROCEDURE — 82248 BILIRUBIN DIRECT: CPT | Performed by: PATHOLOGY

## 2024-04-10 PROCEDURE — 87522 HEPATITIS C REVRS TRNSCRPJ: CPT | Performed by: INTERNAL MEDICINE

## 2024-04-10 PROCEDURE — 86708 HEPATITIS A ANTIBODY: CPT | Performed by: INTERNAL MEDICINE

## 2024-04-10 PROCEDURE — G0463 HOSPITAL OUTPT CLINIC VISIT: HCPCS | Performed by: INTERNAL MEDICINE

## 2024-04-10 PROCEDURE — 86706 HEP B SURFACE ANTIBODY: CPT | Performed by: INTERNAL MEDICINE

## 2024-04-10 PROCEDURE — 85027 COMPLETE CBC AUTOMATED: CPT | Performed by: PATHOLOGY

## 2024-04-10 PROCEDURE — 99000 SPECIMEN HANDLING OFFICE-LAB: CPT | Performed by: PATHOLOGY

## 2024-04-10 PROCEDURE — 87340 HEPATITIS B SURFACE AG IA: CPT | Performed by: INTERNAL MEDICINE

## 2024-04-10 PROCEDURE — 80053 COMPREHEN METABOLIC PANEL: CPT | Performed by: PATHOLOGY

## 2024-04-10 PROCEDURE — 82105 ALPHA-FETOPROTEIN SERUM: CPT | Performed by: INTERNAL MEDICINE

## 2024-04-10 PROCEDURE — 36415 COLL VENOUS BLD VENIPUNCTURE: CPT | Performed by: PATHOLOGY

## 2024-04-10 PROCEDURE — 87389 HIV-1 AG W/HIV-1&-2 AB AG IA: CPT | Performed by: INTERNAL MEDICINE

## 2024-04-10 PROCEDURE — 82550 ASSAY OF CK (CPK): CPT | Performed by: PATHOLOGY

## 2024-04-10 PROCEDURE — 83036 HEMOGLOBIN GLYCOSYLATED A1C: CPT | Performed by: INTERNAL MEDICINE

## 2024-04-10 PROCEDURE — 86704 HEP B CORE ANTIBODY TOTAL: CPT | Performed by: INTERNAL MEDICINE

## 2024-04-10 RX ORDER — LANOLIN ALCOHOL/MO/W.PET/CERES
100 CREAM (GRAM) TOPICAL DAILY
Qty: 30 TABLET | Refills: 1 | Status: SHIPPED | OUTPATIENT
Start: 2024-04-10

## 2024-04-10 RX ORDER — METHADONE HYDROCHLORIDE 10 MG/1
120 TABLET ORAL DAILY
COMMUNITY

## 2024-04-10 ASSESSMENT — PAIN SCALES - GENERAL: PAINLEVEL: NO PAIN (0)
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
## 2024-04-10 NOTE — NURSING NOTE
"Chief Complaint   Patient presents with    Consult     Acute hepatitis     Vital signs:      BP: 106/71 Pulse: 58     SpO2: 96 %       Weight: 91.2 kg (201 lb)  Estimated body mass index is 28.03 kg/m  as calculated from the following:    Height as of 4/9/24: 1.803 m (5' 11\").    Weight as of this encounter: 91.2 kg (201 lb).      Carissa Rogers CMA   4/10/2024 8:49 AM    "

## 2024-04-10 NOTE — PROGRESS NOTES
Physician Attestation   I, Mj Uriostegui MD, saw this patient and agree with the findings and plan of care as documented in the note.      Items personally reviewed/procedural attestation: vitals.    Mj Uriostegui MD

## 2024-04-11 ENCOUNTER — ANCILLARY PROCEDURE (OUTPATIENT)
Dept: ULTRASOUND IMAGING | Facility: CLINIC | Age: 54
End: 2024-04-11
Attending: INTERNAL MEDICINE
Payer: COMMERCIAL

## 2024-04-11 DIAGNOSIS — B18.2 CHRONIC HEPATITIS C WITHOUT HEPATIC COMA (H): ICD-10-CM

## 2024-04-11 LAB
HCV RNA SERPL NAA+PROBE-ACNC: ABNORMAL IU/ML
HCV RNA SERPL NAA+PROBE-LOG IU: 4.1 {LOG_IU}/ML

## 2024-04-11 PROCEDURE — 76700 US EXAM ABDOM COMPLETE: CPT | Mod: GC | Performed by: RADIOLOGY

## 2024-04-12 NOTE — PROGRESS NOTES
Medication Therapy Management (MTM) Encounter    ASSESSMENT:                            Medication Adherence/Access: No issues identified    Depression/PTSD/Biopolar/Borderline schizophrenia:   He is set up with psychology but not psychiatry. He is seeing Dr Casarez tomorrow; he can refer if he feels is needed. Ok to take 1 tablet of Seroquel tonight then discuss dosing with Dr. Casarez tomorrow due to liver/hep c issues. Generally would start with 25-50mg daily in this setting. He would like to get back on sertraline as it helped him in the past. Will discuss dosing with Dr. Casarez.. Due to his hepatitis C with elevated AST/ALT and INR, would recommend starting with a low dose.  Patient does not wish to restart Straterra, Abilify, Gabapentin, Rozerem. Unsure about clonidine as he does not remember if it helped or not.    Psychoactive substance abuse with anxiety disorder:   Doing well; off Fentanyl for 6 weeks. Congratulated patient and provided encouragement for staying off Fentanyl. Managed by methadone clinic    Asthma/COPD:   Unclear diagnosis. Searched hospital records from Jan 2024 at [facility]- saw they planned to do spirometry but I could not find results. Continue albuterol for now but would benefit from a definitive diagnosis with spirometry or find results. If asthma, would benefit switching to SMART therapy. If COPD, may benefit from LAMA/LABA. If COPD, he would be risk group E due to exacerbation leading to hospitalization in Jan 2024, regardless of ACT score (if the exacerbation was a COPD vs asthma exacerbation).  I provided education on proper inhaler technique. After 3 tries he was able to use his MDI with the proper technique. Educated him to not use more than 2 puffs at a time.    Nicotine use disorder:  He is ready to quit now. He would benefit from starting nicotine patch plus gum, which worked for him in the past.      PLAN:                            Take ONE Seroquel tonight; talk to Dr. Casraez  tomorrow.  START nicotine patch 14mg- change it daily- use for 4 weeks.  AFTER 4 weeks decrease to 7mg patch daily for 4 weeks.  Use nicotine gum 2mg whenever you have cravings. After you get off the nicotine patches, just use the gum as needed  Use new technique for albuterol as we discussed. Do not use more than 2 puffs at a time.  Informed Dr. Casarez that patient does not wish to restart Straterra, Abilify, Gabapentin, or Rozerem but he is unsure about clonidine as he does not remember if it helped or not.    Follow-up: tomorrow with Dr Casarez. As needed with PharmD    Medication issues to be addressed at a future visit    Dr Casarez to refer to psychiatry if needed  Seroquel- take 25-50mg daily- no higher for initial dose with current untreated hepatitis C  Sertraline- start at 12.5mg daily if starting due to hepatitis   Consider switch to SMART or add LAMA or LAMA-LABA depending on diagnosis      SUBJECTIVE/OBJECTIVE:                          Fito Daniel is a 54 year old male seen for an initial visit. He was referred to me from Dr Casarez. Patient was accompanied by his advocate Ashley.     Reason for visit: Medication Therapy Management.    Allergies/ADRs: Reviewed in chart  Past Medical History: Reviewed in chart  Social History     Tobacco Use    Smoking status: Some Days     Current packs/day: 0.00     Types: Cigarettes     Last attempt to quit: 2011     Years since quittin.3    Smokeless tobacco: Never   Vaping Use    Vaping status: Every Day   Substance Use Topics    Alcohol use: Not Currently    Drug use: Yes     Types: Fentanyl, IV     Tobacco: He reports that he has been smoking cigarettes. He has never used smokeless tobacco.Nicotine/Tobacco Cessation Plan  See below; starting nicotine cessation plan tomorrow    ^Reviewed today    Medication Adherence/Access: No issues identified      Depression/PTSD/Biopolar/Borderline schizophrenia:   Quetiapine 100mg (2 x 50mg) daily [restarted 24 by   Hermelindo]  Has been taking 1-2 tablets at bedtime; just picked up 3 days ago and just took it for 2 nights. It is helping him sleep but making him tired in the morning/difficulty getting up.    Has his first psychology appointment coming up soon at [facility]. He does not have a psychiatrist. Dr. Casarez had discussed getting him connected with psychiatry.    Other psych meds he was previously taking that were not restarted (last filled Dec 2023):  - Sertraline 50mg daily- he thinks this did help- WOULD LIKE TO RESTART  - Aripiprazole 5mg daily- he doesn't think this did anything for him; he felt no different. DO NOT RESTART  - Atomoxetine (Strattera) 40mg daily- he felt that one caused heart palpitations- DO NOT RESTART  - Clonidine 0.1mg daily- he doesn't remember if it helped (COULD start but he doesn't know if it worked)  - Gabapentin 100g (? Dose)- he thinks it helped with nerve pain a little but it caused his lower legs to swell up- he has no nerve pain- DO NOT RESTART  - Rozerem 8mg bedtime - it helped but he would rather take seroquel. DO NOT RESTART    He felt they were just cramming lots of meds down his throat and isn't sure they were helping.   He is feeling he is not sure he is depressed. He has racing thoughts and has interest in things but can't finish a drawing or a movie, etc.  He took sertraline and fluoxetine in the past; he thinks the sertraline helped him more.        4/9/2024     4:16 PM   PHQ   PHQ-9 Total Score 13   Q9: Thoughts of better off dead/self-harm past 2 weeks Not at all      Psychoactive substance abuse with anxiety disorder:   Methadone 100mg daily- gets at the methadone clinic  Suboxone 4-1mg SL films- 2 films daily (? Unsure if taking; last filled 12/7/23) He took that in the past but he discontinued that due to liver issues- stopped around December.    He is doing well on the methadone and has been off fentanyl since March 4. No cravings.    Asthma/COPD:   Albuterol MDI as needed  "[restarted 4/9/24 by Dr Casarez]- using when going for long walks/uphill. Only needs if doing something strenuous; does not need when just resting or walking more slowly. Using 3-4 puffs at a time. Checked technique and it was poor. Open mouth technique and he did not inhale.  Received prednisone burst in January 2024  Had RSV and pneumonia in January 2024. Prior to that he didn't have breathing problems and wasn't on inhalers. Hospitalization at [facility] labeled it as Asthma/COPD. Not sure if he had PFTs done.    Nicotine use disorder:  He currently smokes and has smoked since age 19; quit a couple times for a couple years. Smokes 3 cigarettes daily. Vapes 10 puffs daily (nicotine with flavor). He recently added the vaping as he thought it was \"healthier\"; prior to that he was smoking a half pack per day.  He rates himself as a 9-10 on desire to quit and 10 in his belief that he can quit. He used nicotine patches plus gum in the past and that worked for him. He would like to give it a try and quit.  Cravings: being around other smokers.  He wants to get back to running and biking    Hepatitis C:  Diagnosed Dec 2023. Saw Hepatology 4/10/24. Had elevated LFTs (, ; INR 1.28). Ordered additional labs, ultrasound, liver biopsy, then they will consider starting hepatitis C medication. Had it all done now; had biopsy this morning  They had recommended avoiding ANY supplements or OTC medications not prescribed. He is currently not taking any medications that are not prescribed.    Supplements:   Thiamine 100mg daily- just started it a couple days ago.    BP Readings from Last 1 Encounters:   04/15/24 113/73     Pulse Readings from Last 1 Encounters:   04/15/24 97     Wt Readings from Last 1 Encounters:   04/15/24 203 lb 3.2 oz (92.2 kg)     Ht Readings from Last 1 Encounters:   04/15/24 5' 11\" (1.803 m)     Estimated body mass index is 28.34 kg/m  as calculated from the following:    Height as of an earlier " "encounter on 4/15/24: 5' 11\" (1.803 m).    Weight as of this encounter: 203 lb 3.2 oz (92.2 kg).    Temp Readings from Last 1 Encounters:   04/15/24 97.6  F (36.4  C) (Oral)       ----------------    I spent 45 minutes with this patient today. Dr. Casarez (resident physician) was provided the recommendations above  via routed note and Dr. Mensah is the authorizing prescriber for this visit through the pharmacist collaborative practice agreement.. A copy of the visit note was provided to the patient's provider(s).    A summary of these recommendations was given to the patient.    Aida Rankin, Pharm.D.       Medication Therapy Recommendations  Depression    Current Medication: QUEtiapine (SEROQUEL) 50 MG tablet   Rationale: Dose too high - Dosage too high - Safety   Recommendation: Decrease Dose   Status: Accepted per CPA         Nicotine use disorder    Current Medication: nicotine (NICODERM CQ) 14 MG/24HR 24 hr patch   Rationale: Untreated condition - Needs additional medication therapy - Indication   Recommendation: Start Medication   Status: Accepted per CPA         Reactive airway disease without complication, unspecified asthma severity, unspecified whether persistent    Current Medication: albuterol (PROAIR HFA/PROVENTIL HFA/VENTOLIN HFA) 108 (90 Base) MCG/ACT inhaler   Rationale: Does not understand instructions - Adherence - Adherence   Recommendation: Provide Education   Status: Patient Agreed - Adherence/Education                "

## 2024-04-14 ENCOUNTER — ANESTHESIA EVENT (OUTPATIENT)
Dept: SURGERY | Facility: AMBULATORY SURGERY CENTER | Age: 54
End: 2024-04-14
Payer: COMMERCIAL

## 2024-04-14 ASSESSMENT — COPD QUESTIONNAIRES: COPD: 1

## 2024-04-14 ASSESSMENT — ENCOUNTER SYMPTOMS: SEIZURES: 0
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
## 2024-04-15 ENCOUNTER — ANCILLARY PROCEDURE (OUTPATIENT)
Dept: RADIOLOGY | Facility: AMBULATORY SURGERY CENTER | Age: 54
End: 2024-04-15
Attending: INTERNAL MEDICINE
Payer: COMMERCIAL

## 2024-04-15 ENCOUNTER — OFFICE VISIT (OUTPATIENT)
Dept: PHARMACY | Facility: CLINIC | Age: 54
End: 2024-04-15
Payer: COMMERCIAL

## 2024-04-15 ENCOUNTER — HOSPITAL ENCOUNTER (OUTPATIENT)
Facility: AMBULATORY SURGERY CENTER | Age: 54
Discharge: HOME | End: 2024-04-15
Attending: RADIOLOGY
Payer: COMMERCIAL

## 2024-04-15 ENCOUNTER — ANESTHESIA (OUTPATIENT)
Dept: SURGERY | Facility: AMBULATORY SURGERY CENTER | Age: 54
End: 2024-04-15
Payer: COMMERCIAL

## 2024-04-15 VITALS
OXYGEN SATURATION: 97 % | DIASTOLIC BLOOD PRESSURE: 68 MMHG | RESPIRATION RATE: 16 BRPM | SYSTOLIC BLOOD PRESSURE: 100 MMHG | TEMPERATURE: 97 F | WEIGHT: 198 LBS | HEIGHT: 71 IN | HEART RATE: 65 BPM | BODY MASS INDEX: 27.72 KG/M2

## 2024-04-15 VITALS
WEIGHT: 203.2 LBS | OXYGEN SATURATION: 96 % | BODY MASS INDEX: 28.34 KG/M2 | RESPIRATION RATE: 16 BRPM | HEART RATE: 97 BPM | SYSTOLIC BLOOD PRESSURE: 113 MMHG | DIASTOLIC BLOOD PRESSURE: 73 MMHG | TEMPERATURE: 97.6 F

## 2024-04-15 DIAGNOSIS — F33.41 RECURRENT MAJOR DEPRESSIVE DISORDER, IN PARTIAL REMISSION (H): ICD-10-CM

## 2024-04-15 DIAGNOSIS — J45.909 REACTIVE AIRWAY DISEASE WITHOUT COMPLICATION, UNSPECIFIED ASTHMA SEVERITY, UNSPECIFIED WHETHER PERSISTENT: ICD-10-CM

## 2024-04-15 DIAGNOSIS — F31.9 BIPOLAR 1 DISORDER (H): ICD-10-CM

## 2024-04-15 DIAGNOSIS — B18.2 CHRONIC HEPATITIS C WITHOUT HEPATIC COMA (H): ICD-10-CM

## 2024-04-15 DIAGNOSIS — F21: ICD-10-CM

## 2024-04-15 DIAGNOSIS — B17.9 ACUTE HEPATITIS: ICD-10-CM

## 2024-04-15 DIAGNOSIS — F17.200 NICOTINE USE DISORDER: Primary | ICD-10-CM

## 2024-04-15 DIAGNOSIS — F98.8 ATTENTION DEFICIT DISORDER, UNSPECIFIED HYPERACTIVITY PRESENCE: ICD-10-CM

## 2024-04-15 PROCEDURE — 99607 MTMS BY PHARM ADDL 15 MIN: CPT | Performed by: PHARMACIST

## 2024-04-15 PROCEDURE — 47000 NEEDLE BIOPSY OF LIVER PERQ: CPT | Performed by: RADIOLOGY

## 2024-04-15 PROCEDURE — 88313 SPECIAL STAINS GROUP 2: CPT | Mod: TC | Performed by: RADIOLOGY

## 2024-04-15 PROCEDURE — 99605 MTMS BY PHARM NP 15 MIN: CPT | Performed by: PHARMACIST

## 2024-04-15 PROCEDURE — 47000 NEEDLE BIOPSY OF LIVER PERQ: CPT | Performed by: NURSE ANESTHETIST, CERTIFIED REGISTERED

## 2024-04-15 PROCEDURE — 88307 TISSUE EXAM BY PATHOLOGIST: CPT | Mod: 26 | Performed by: PATHOLOGY

## 2024-04-15 PROCEDURE — 76942 ECHO GUIDE FOR BIOPSY: CPT | Mod: 26 | Performed by: RADIOLOGY

## 2024-04-15 PROCEDURE — 88313 SPECIAL STAINS GROUP 2: CPT | Mod: 26 | Performed by: PATHOLOGY

## 2024-04-15 PROCEDURE — 47000 NEEDLE BIOPSY OF LIVER PERQ: CPT | Performed by: ANESTHESIOLOGY

## 2024-04-15 RX ORDER — SODIUM CHLORIDE, SODIUM LACTATE, POTASSIUM CHLORIDE, CALCIUM CHLORIDE 600; 310; 30; 20 MG/100ML; MG/100ML; MG/100ML; MG/100ML
INJECTION, SOLUTION INTRAVENOUS CONTINUOUS
Status: DISCONTINUED | OUTPATIENT
Start: 2024-04-15 | End: 2024-04-16 | Stop reason: HOSPADM

## 2024-04-15 RX ORDER — PROPOFOL 10 MG/ML
INJECTION, EMULSION INTRAVENOUS CONTINUOUS PRN
Status: DISCONTINUED | OUTPATIENT
Start: 2024-04-15 | End: 2024-04-15

## 2024-04-15 RX ORDER — NICOTINE 21 MG/24HR
1 PATCH, TRANSDERMAL 24 HOURS TRANSDERMAL EVERY 24 HOURS
Qty: 28 PATCH | Refills: 0 | Status: SHIPPED | OUTPATIENT
Start: 2024-04-15

## 2024-04-15 RX ORDER — PROPOFOL 10 MG/ML
INJECTION, EMULSION INTRAVENOUS PRN
Status: DISCONTINUED | OUTPATIENT
Start: 2024-04-15 | End: 2024-04-15

## 2024-04-15 RX ORDER — KETAMINE HYDROCHLORIDE 10 MG/ML
INJECTION INTRAMUSCULAR; INTRAVENOUS PRN
Status: DISCONTINUED | OUTPATIENT
Start: 2024-04-15 | End: 2024-04-15

## 2024-04-15 RX ORDER — LIDOCAINE HYDROCHLORIDE 20 MG/ML
INJECTION, SOLUTION INFILTRATION; PERINEURAL PRN
Status: DISCONTINUED | OUTPATIENT
Start: 2024-04-15 | End: 2024-04-15

## 2024-04-15 RX ADMIN — PROPOFOL 60 MG: 10 INJECTION, EMULSION INTRAVENOUS at 07:46

## 2024-04-15 RX ADMIN — Medication 200 MCG: at 08:06

## 2024-04-15 RX ADMIN — PROPOFOL 150 MCG/KG/MIN: 10 INJECTION, EMULSION INTRAVENOUS at 07:44

## 2024-04-15 RX ADMIN — PROPOFOL 80 MG: 10 INJECTION, EMULSION INTRAVENOUS at 07:44

## 2024-04-15 RX ADMIN — Medication 100 MCG: at 07:52

## 2024-04-15 RX ADMIN — SODIUM CHLORIDE, SODIUM LACTATE, POTASSIUM CHLORIDE, CALCIUM CHLORIDE: 600; 310; 30; 20 INJECTION, SOLUTION INTRAVENOUS at 07:38

## 2024-04-15 RX ADMIN — LIDOCAINE HYDROCHLORIDE 80 MG: 20 INJECTION, SOLUTION INFILTRATION; PERINEURAL at 07:44

## 2024-04-15 RX ADMIN — Medication 100 MCG: at 07:59

## 2024-04-15 RX ADMIN — Medication 100 MCG: at 08:03

## 2024-04-15 RX ADMIN — KETAMINE HYDROCHLORIDE 30 MG: 10 INJECTION INTRAMUSCULAR; INTRAVENOUS at 07:46

## 2024-04-15 NOTE — ANESTHESIA POSTPROCEDURE EVALUATION
Patient: Fito Daniel    Procedure: Procedure(s):  Percutaneous biopsy liver       Anesthesia Type:  MAC    Note:  Disposition: Outpatient   Postop Pain Control: Uneventful            Sign Out: Well controlled pain   PONV: No   Neuro/Psych: Uneventful            Sign Out: Acceptable/Baseline neuro status   Airway/Respiratory: Uneventful            Sign Out: Acceptable/Baseline resp. status   CV/Hemodynamics: Uneventful            Sign Out: Acceptable CV status; No obvious hypovolemia; No obvious fluid overload   Other NRE: NONE   DID A NON-ROUTINE EVENT OCCUR? No           Last vitals:  Vitals Value Taken Time   /68 04/15/24 0900   Temp 36.1  C (97  F) 04/15/24 0900   Pulse     Resp 16 04/15/24 0900   SpO2 97 % 04/15/24 0900       Electronically Signed By: Matt Ayala MD  April 15, 2024  11:31 AM

## 2024-04-15 NOTE — Clinical Note
Rah Lentz, Saw this pt today. He is seeing you tomorrow. Please see my note on med things. For you for tomorrow- refer to psychiatry if you think needed. Consider decreasing seroquel - 25-50mg daily would be better for now with his Hep C currently being evaluated, with elevated LFTs and slightly elevated INR- just saw hepatology and got labs/ultrasound and had liver biopsy yesterday. Also, I included in my note all the psych meds he doesn't want to restart. He would like to restart sertraline but I would recommend starting at a low dose due to his liver. Like 12.5mg daily. I did not start this- you can evaluated tomorrow. I also talked about smoking cessation (started patch + gum) and corrected his poor inhaler technique.

## 2024-04-15 NOTE — DISCHARGE INSTRUCTIONS
A collaboration between [facility] and [facility]  Experts in minimally invasive, targeted treatments performed using imaging guidance    Liver Biopsy    Today you had a needle remove a piece of tissue from your liver.    After you go home:  Keep any applied tape/gauze dressings clean and dry.  Change tape/gauze dressings if they get wet or soiled.  You may remove the dressing 48 hours after the procedure.  Don't shower until 48 hours after the procedure.  Do not perform strenuous activities or lift greater than 10 pounds for the next three days.  If there is bleeding or oozing from the procedure site, apply firm pressure to the area for 5-10 minutes.  If the bleeding continues seek medical advice at the numbers below.    For 24 hours after any sedation used:  Relax and take it easy.  No strenuous activities.  Do not drive or operate machines at home or at work.  No alcohol consumption.  Do not make any important or legal decisions.    Call our Interventional Radiology (IR) service if:  If you start bleeding from the procedure site.  If you do start to bleed from the site, lie down and hold some pressure on the site.  Our radiology provider can help you decide if you need to return to the hospital.  If you feel dizzy or lightheaded.  If you suddenly feel short of breath.  If you have persistent pain at biopsy site.  If the skin around the biopsy site is swollen, reddened, painful, or has any discharge.  If you feel nauseated and  just not right .  If you have a fever of greater than 100.5  F and chills in the first 5 days after procedure.  Any other concerns related to your procedure.    [facility]  Interventional Radiology (IR)  [address] [place] [address]    Contact Number:  [phone]  (IR control desk)  Monday - Friday 8:00 am - 4:30 pm    After hours for urgent concerns:   [phone]  After 4:30 pm Monday - Friday, Weekends and Holidays.   Ask for Interventional Radiology on-call.  Someone is available 24 hours a day.  [facility] toll free number:  [phone]

## 2024-04-15 NOTE — BRIEF OP NOTE
[facility]    Brief Operative Note    Pre-operative diagnosis: Acute viral hepatitis, unspecified viral hepatitis type [B17.9]  Post-operative diagnosis Same as pre-operative diagnosis    Procedure: Percutaneous biopsy liver, N/A - Abdomen    Surgeon: Surgeons and Role:     * Lan Redding MD - Primary  Anesthesia: MAC   Estimated Blood Loss: Minimal    Drains: None  Specimens:   ID Type Source Tests Collected by Time Destination   1 : Random liver biopsy Biopsy Liver SURGICAL PATHOLOGY EXAM Lan Redding MD 4/15/2024  6:35 AM      Findings:   None.  Complications: None.  Implants: * No implants in log *        17 gauge coaxial needle placed in liver from right intercostal approach under ultrasound guidance. Three passes made with Temno ACT biopsy needle through the coaxial needle and three good 18 gauge cores obtained and submitted in formalin. Good hemostasis with gelatin plugs that were deployed as the needle was removed.

## 2024-04-15 NOTE — ANESTHESIA CARE TRANSFER NOTE
Patient: Fito Daniel    Procedure: Procedure(s):  Percutaneous biopsy liver       Diagnosis: Acute viral hepatitis, unspecified viral hepatitis type [B17.9]  Diagnosis Additional Information: No value filed.    Anesthesia Type:   MAC     Note:    Oropharynx: oropharynx clear of all foreign objects and spontaneously breathing  Level of Consciousness: drowsy  Oxygen Supplementation: room air    Independent Airway: airway patency satisfactory and stable  Dentition: dentition unchanged  Vital Signs Stable: post-procedure vital signs reviewed and stable  Report to RN Given: handoff report given  Patient transferred to: Phase II    Handoff Report: Identifed the Patient, Identified the Reponsible Provider, Reviewed the pertinent medical history, Discussed the surgical course, Reviewed Intra-OP anesthesia mangement and issues during anesthesia, Set expectations for post-procedure period and Allowed opportunity for questions and acknowledgement of understanding      Vitals:  Vitals Value Taken Time   BP 86/51 04/15/24 0813   Temp     Pulse 56    Resp 14 04/15/24 0813   SpO2 94 % 04/15/24 0813       Electronically Signed By: SHANNAN Dubois CRNA  April 15, 2024  8:16 AM

## 2024-04-15 NOTE — PATIENT INSTRUCTIONS
"Recommendations from today's MTM visit:                                                    MTM (medication therapy management) is a service provided by a clinical pharmacist designed to help you get the most of out of your medicines.   Today we reviewed what your medicines are for, how to know if they are working, made sure that your medicines are safe for you and how to make your medicine regimen as easy to take as possible.    Here is what we talked about today about your medicines.    Take ONE Seroquel tesfaye; talk to Dr. Casarez tomorrow.  START nicotine patch 14mg- change it daily- use for 4 weeks.  AFTER 4 weeks decrease to 7mg patch daily.  Use nicotine gum 2mg whenever you have cravings  After you get off the nicotine patches, just use the gum as needed  Use new technique for albuterol    It was great speaking with you today! I value your experience and would be very thankful for your time in providing feedback in our clinic survey. In the next few days, you may receive an email or text message from [employer] with a link to a survey related to your  clinical pharmacist.\"    To schedule another MTM appointment, please call the clinic directly at [phone] and ask to make an appointment with one of our pharmacists.     My Clinical Pharmacist's contact information:                                                      Please feel free to contact me with any questions or concerns you have!     Aida Rankin, Pharm.D.  Clinical Pharmacist  [address] [address]  Office Phone: [phone]   "

## 2024-04-16 ENCOUNTER — OFFICE VISIT (OUTPATIENT)
Dept: FAMILY MEDICINE | Facility: CLINIC | Age: 54
End: 2024-04-16
Payer: COMMERCIAL

## 2024-04-16 VITALS
OXYGEN SATURATION: 98 % | HEIGHT: 71 IN | TEMPERATURE: 98 F | HEART RATE: 66 BPM | WEIGHT: 201 LBS | DIASTOLIC BLOOD PRESSURE: 78 MMHG | SYSTOLIC BLOOD PRESSURE: 112 MMHG | BODY MASS INDEX: 28.14 KG/M2 | RESPIRATION RATE: 18 BRPM

## 2024-04-16 DIAGNOSIS — B18.2 CHRONIC HEPATITIS C WITHOUT HEPATIC COMA (H): Primary | ICD-10-CM

## 2024-04-16 DIAGNOSIS — F33.41 RECURRENT MAJOR DEPRESSIVE DISORDER, IN PARTIAL REMISSION (H): Primary | ICD-10-CM

## 2024-04-16 DIAGNOSIS — J45.909 REACTIVE AIRWAY DISEASE WITHOUT COMPLICATION, UNSPECIFIED ASTHMA SEVERITY, UNSPECIFIED WHETHER PERSISTENT: ICD-10-CM

## 2024-04-16 LAB
PATH REPORT.COMMENTS IMP SPEC: NORMAL
PATH REPORT.FINAL DX SPEC: NORMAL
PATH REPORT.GROSS SPEC: NORMAL
PATH REPORT.MICROSCOPIC SPEC OTHER STN: NORMAL
PATH REPORT.RELEVANT HX SPEC: NORMAL
PHOTO IMAGE: NORMAL

## 2024-04-16 PROCEDURE — 99213 OFFICE O/P EST LOW 20 MIN: CPT | Mod: GC

## 2024-04-16 NOTE — PROGRESS NOTES
Preceptor Attestation:    I discussed the patient with the resident and evaluated the patient in person. I have verified the content of the note, which accurately reflects my assessment of the patient and the plan of care.   Supervising Physician:  Osbaldo Mensah MD.
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
## 2024-04-16 NOTE — PATIENT INSTRUCTIONS
Today, we talked about your seroquel. Please if you can, take either 1 tablet or a half tablet to help you with sleep. It can affect your liver, so we want to be careful with taking too much of it.     I've put in a psychiatry referral to assist us in reviewing your mental health history and we will see if they have any recommendations for medications. Please keep your psychology appointment with Janine.     Let's follow-up in 2 weeks for a complete physical exam.

## 2024-04-16 NOTE — CONFIDENTIAL NOTE
Called patient regarding liver biopsy results.  He has grade 3 out of 4 hepatitis with known to minimal fibrosis.    The inflammation is described as portal inflammation with predominantly lymphocytic inflammation and associated interface and lobular activity with hepatocellular injury and dropout necrosis.  Iron stain is negative.  No cytoplasmic inclusions.    Patient has used methamphetamines, fentanyl and heroin which may have contributed to his active hepatitis.  Hepatitis A and C studies were negative.  HIV negative.    Will add ANNALISA and F-actin for any possible autoimmune component although less likely.    Plan for repeat labs in 1 week plus Mavyret treatment x 8 weeks.

## 2024-04-17 ENCOUNTER — TELEPHONE (OUTPATIENT)
Dept: GASTROENTEROLOGY | Facility: CLINIC | Age: 54
End: 2024-04-17
Payer: COMMERCIAL

## 2024-04-17 DIAGNOSIS — B18.2 CHRONIC HEPATITIS C WITHOUT HEPATIC COMA (H): Primary | ICD-10-CM

## 2024-04-18 ENCOUNTER — LAB (OUTPATIENT)
Dept: LAB | Facility: CLINIC | Age: 54
End: 2024-04-18
Payer: COMMERCIAL

## 2024-04-18 ENCOUNTER — TELEPHONE (OUTPATIENT)
Dept: GASTROENTEROLOGY | Facility: CLINIC | Age: 54
End: 2024-04-18
Payer: COMMERCIAL

## 2024-04-18 DIAGNOSIS — B18.2 CHRONIC HEPATITIS C WITHOUT HEPATIC COMA (H): ICD-10-CM

## 2024-04-18 DIAGNOSIS — B18.2 CHRONIC HEPATITIS C WITHOUT HEPATIC COMA (H): Primary | ICD-10-CM

## 2024-04-18 DIAGNOSIS — B17.9 ACUTE HEPATITIS: ICD-10-CM

## 2024-04-18 LAB
LAB DIRECTOR COMMENTS: NORMAL
LAB DIRECTOR DISCLAIMER: NORMAL
LAB DIRECTOR INTERPRETATION: NORMAL
LAB DIRECTOR METHODOLOGY: NORMAL
LAB DIRECTOR RESULTS: NORMAL
LOCATION OF TASK: NORMAL
SPECIMEN DESCRIPTION: NORMAL

## 2024-04-18 PROCEDURE — 87902 NFCT AGT GNTYP ALYS HEP C: CPT | Mod: 90

## 2024-04-18 PROCEDURE — 87799 DETECT AGENT NOS DNA QUANT: CPT

## 2024-04-18 PROCEDURE — 99000 SPECIMEN HANDLING OFFICE-LAB: CPT

## 2024-04-18 PROCEDURE — 86038 ANTINUCLEAR ANTIBODIES: CPT

## 2024-04-18 PROCEDURE — 36415 COLL VENOUS BLD VENIPUNCTURE: CPT

## 2024-04-18 PROCEDURE — 80076 HEPATIC FUNCTION PANEL: CPT

## 2024-04-18 PROCEDURE — 83516 IMMUNOASSAY NONANTIBODY: CPT | Mod: 90

## 2024-04-18 PROCEDURE — G0452 MOLECULAR PATHOLOGY INTERPR: HCPCS | Performed by: STUDENT IN AN ORGANIZED HEALTH CARE EDUCATION/TRAINING PROGRAM

## 2024-04-18 PROCEDURE — 81256 HFE GENE: CPT | Mod: XE

## 2024-04-18 NOTE — TELEPHONE ENCOUNTER
Notified pt that an additional lab will need to be completed prior to insurance authorizing hepatitis C treatment. Hepatitis C high resolution genotype ordered and reviewed that pt can go to any [facility] to get this drawn. Pt verbalized understanding and is agreeable to plan of care.

## 2024-04-19 LAB
ANA SER QL IF: NEGATIVE
CMV DNA SPEC NAA+PROBE-ACNC: NOT DETECTED IU/ML
EBV DNA SERPL NAA+PROBE-ACNC: NOT DETECTED IU/ML

## 2024-04-20 LAB
ALBUMIN SERPL BCG-MCNC: 3.8 G/DL (ref 3.5–5.2)
ALP SERPL-CCNC: 129 U/L (ref 40–150)
ALT SERPL W P-5'-P-CCNC: 63 U/L (ref 0–70)
AST SERPL W P-5'-P-CCNC: 29 U/L (ref 0–45)
BILIRUB DIRECT SERPL-MCNC: <0.2 MG/DL (ref 0–0.3)
BILIRUB SERPL-MCNC: 0.4 MG/DL
PROT SERPL-MCNC: 7.1 G/DL (ref 6.4–8.3)
SMA IGG SER-ACNC: 10 UNITS

## 2024-04-26 LAB — HCV GENTYP SERPL NAA+PROBE: NORMAL

## 2024-04-26 NOTE — TELEPHONE ENCOUNTER
Discussed hepatitis C genotype lab came back as indeterminate. Patient's insurance requires genotype is attempted x 2.     Wife expressed concerns about the lab coming back again as indeterminate. Writer explained that is out of hepatology clinic's control and insurance requires 2 attempts to move forward with treatment. Assured wife this lab is needed for insurance purposes.     HCV genotype ordered.     DEONTE Hernandez, RN, PHN  Hepatology Clinic  [facility]  [facility]

## 2024-04-27 ENCOUNTER — HEALTH MAINTENANCE LETTER (OUTPATIENT)
Age: 54
End: 2024-04-27

## 2024-04-30 ENCOUNTER — OFFICE VISIT (OUTPATIENT)
Dept: FAMILY MEDICINE | Facility: CLINIC | Age: 54
End: 2024-04-30
Payer: COMMERCIAL

## 2024-04-30 VITALS
TEMPERATURE: 98 F | HEART RATE: 77 BPM | OXYGEN SATURATION: 100 % | SYSTOLIC BLOOD PRESSURE: 104 MMHG | RESPIRATION RATE: 16 BRPM | HEIGHT: 71 IN | WEIGHT: 214 LBS | BODY MASS INDEX: 29.96 KG/M2 | DIASTOLIC BLOOD PRESSURE: 70 MMHG

## 2024-04-30 DIAGNOSIS — Z87.891 PERSONAL HISTORY OF TOBACCO USE: ICD-10-CM

## 2024-04-30 DIAGNOSIS — Z23 NEED FOR VACCINATION: ICD-10-CM

## 2024-04-30 DIAGNOSIS — Z00.00 ROUTINE GENERAL MEDICAL EXAMINATION AT A HEALTH CARE FACILITY: ICD-10-CM

## 2024-04-30 DIAGNOSIS — J45.909 REACTIVE AIRWAY DISEASE WITHOUT COMPLICATION, UNSPECIFIED ASTHMA SEVERITY, UNSPECIFIED WHETHER PERSISTENT: Primary | ICD-10-CM

## 2024-04-30 PROBLEM — Z12.11 SCREEN FOR COLON CANCER: Status: ACTIVE | Noted: 2024-04-09

## 2024-04-30 LAB
CHOLEST SERPL-MCNC: 150 MG/DL
FASTING STATUS PATIENT QL REPORTED: NORMAL
HDLC SERPL-MCNC: 48 MG/DL
LDLC SERPL CALC-MCNC: 79 MG/DL
NONHDLC SERPL-MCNC: 102 MG/DL
TRIGL SERPL-MCNC: 115 MG/DL

## 2024-04-30 PROCEDURE — 99214 OFFICE O/P EST MOD 30 MIN: CPT | Mod: 25

## 2024-04-30 PROCEDURE — 90746 HEPB VACCINE 3 DOSE ADULT IM: CPT

## 2024-04-30 PROCEDURE — 94060 EVALUATION OF WHEEZING: CPT

## 2024-04-30 PROCEDURE — 99396 PREV VISIT EST AGE 40-64: CPT | Mod: 25

## 2024-04-30 PROCEDURE — 90472 IMMUNIZATION ADMIN EACH ADD: CPT

## 2024-04-30 PROCEDURE — 80061 LIPID PANEL: CPT

## 2024-04-30 PROCEDURE — 90632 HEPA VACCINE ADULT IM: CPT

## 2024-04-30 PROCEDURE — 90677 PCV20 VACCINE IM: CPT

## 2024-04-30 PROCEDURE — 36415 COLL VENOUS BLD VENIPUNCTURE: CPT

## 2024-04-30 PROCEDURE — 90471 IMMUNIZATION ADMIN: CPT

## 2024-04-30 PROCEDURE — 87522 HEPATITIS C REVRS TRNSCRPJ: CPT

## 2024-04-30 RX ORDER — ALBUTEROL SULFATE 90 UG/1
2 AEROSOL, METERED RESPIRATORY (INHALATION) ONCE
Status: ACTIVE | OUTPATIENT
Start: 2024-04-30

## 2024-04-30 SDOH — HEALTH STABILITY: PHYSICAL HEALTH: ON AVERAGE, HOW MANY DAYS PER WEEK DO YOU ENGAGE IN MODERATE TO STRENUOUS EXERCISE (LIKE A BRISK WALK)?: 3 DAYS

## 2024-04-30 SDOH — HEALTH STABILITY: PHYSICAL HEALTH: ON AVERAGE, HOW MANY MINUTES DO YOU ENGAGE IN EXERCISE AT THIS LEVEL?: 30 MIN

## 2024-04-30 ASSESSMENT — SOCIAL DETERMINANTS OF HEALTH (SDOH): HOW OFTEN DO YOU GET TOGETHER WITH FRIENDS OR RELATIVES?: ONCE A WEEK

## 2024-04-30 NOTE — PROGRESS NOTES
Preceptor Attestation:    I discussed the patient with the resident and evaluated the patient in person. I have verified the content of the note, which accurately reflects my assessment of the patient and the plan of care.      Further review of spirometry reveals a very minimally decreased FEV1/FVC ratio with proportionate decreases in both values. There is mild reversibility f the individual FEV1 and FVC, but the ratio does not change or improve. Likely the obstructive component of this patient's respiratory difficulties are minimal. Patient at higher risk for restrictive or infiltrative lung disease. Patient needs full PFTs.     Supervising Physician:  Katja Mejia MD

## 2024-04-30 NOTE — PROGRESS NOTES
Prior to immunization administration, verified patients identity using patient s name and date of birth. Please see Immunization Activity for additional information.     Screening Questionnaire for Adult Immunization    Are you sick today?   No   Do you have allergies to medications, food, a vaccine component or latex?   No   Have you ever had a serious reaction after receiving a vaccination?   No   Do you have a long-term health problem with heart, lung, kidney, or metabolic disease (e.g., diabetes), asthma, a blood disorder, no spleen, complement component deficiency, a cochlear implant, or a spinal fluid leak?  Are you on long-term aspirin therapy?   No   Do you have cancer, leukemia, HIV/AIDS, or any other immune system problem?   No   Do you have a parent, brother, or sister with an immune system problem?   No   In the past 3 months, have you taken medications that affect  your immune system, such as prednisone, other steroids, or anticancer drugs; drugs for the treatment of rheumatoid arthritis, Crohn s disease, or psoriasis; or have you had radiation treatments?   No   Have you had a seizure, or a brain or other nervous system problem?   No   During the past year, have you received a transfusion of blood or blood    products, or been given immune (gamma) globulin or antiviral drug?   No   For women: Are you pregnant or is there a chance you could become       pregnant during the next month?   No   Have you received any vaccinations in the past 4 weeks?   No     Immunization questionnaire answers were all negative.      Patient instructed to remain in clinic for 15 minutes afterwards, and to report any adverse reactions.     Screening performed by José Miguel Prater CMA on 4/30/2024 at 12:03 PM.
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
## 2024-04-30 NOTE — PATIENT INSTRUCTIONS
Thanks for coming in [facility]. Always great to see you. Everything looks good today on examination. Let's follow-up in 3 weeks to discuss other screening tests, such as colon cancer and prostate screening.    Lung Cancer Screening   Frequently Asked Questions  If you are at high-risk for lung cancer, getting screened with low-dose computed tomography (LDCT) every year can help save your life. This handout offers answers to some of the most common questions about lung cancer screening. If you have other questions, please call [phone] ([phone]).     What is it?  Lung cancer screening uses special X-ray technology to create an image of your lung tissue. The exam is quick and easy and takes less than 10 seconds. We don t give you any medicine or use any needles. You can eat before and after the exam. You don t need to change your clothes as long as the clothing on your chest doesn t contain metal. But, you do need to be able to hold your breath for at least 6 seconds during the exam.    What is the goal of lung cancer screening?  The goal of lung cancer screening is to save lives. Many times, lung cancer is not found until a person starts having physical symptoms. Lung cancer screening can help detect lung cancer in the earliest stages when it may be easier to treat.    Who should be screened for lung cancer?  We suggest lung cancer screening for anyone who is at high-risk for lung cancer. You are in the high-risk group if you:     are between the ages of 55 and 79, and   have smoked at least 1 pack of cigarettes a day for 20 or more years, and   still smoke or have quit within the past 15 years.    However, if you have a new cough or shortness of breath, you should talk to your doctor before being screened.    Why does it matter if I have symptoms?  Certain symptoms can be a sign that you have a condition in your lungs that should be checked and treated by your doctor. These symptoms include fever, chest  pain, a new or changing cough, shortness of breath that you have never felt before, coughing up blood or unexplained weight loss. Having any of these symptoms can greatly affect the results of lung cancer screening.       Should all smokers get an LDCT lung cancer screening exam?  It depends. Lung cancer screening is for a very specific group of men and women who have a history of heavy smoking over a long period of time (see  Who should be screened for lung cancer  above).  I am in the high-risk group, but have been diagnosed with cancer in the past. Is LDCT lung cancer screening right for me?  In some cases, you should not have LDCT lung screening, such as when your doctor is already following your cancer with CT scan studies. Your doctor will help you decide if LDCT lung screening is right for you.  Do I need to have a screening exam every year?  Yes. If you are in the high-risk group described earlier, you should get an LDCT lung cancer screening exam every year until you are 79, or are no longer willing or able to undergo screening and possible procedures to diagnose and treat lung cancer.  How effective is LDCT at preventing death from lung cancer?  Studies have shown that LDCT lung cancer screening can lower the risk of death from lung cancer by 20 percent in people who are at high-risk.  What are the risks?  There are some risks and limitations of LDCT lung cancer screening. We want to make sure you understand the risks and benefits, so please let us know if you have any questions. Your doctor may want to talk with you more about these risks.   Radiation exposure: As with any exam that uses radiation, there is a very small increased risk of cancer. The amount of radiation in LDCT is small--about the same amount a person would get from a mammogram. Your doctor orders the exam when he or she feels the potential benefits outweigh the risks.   False negatives: No test is perfect, including LDCT. It is possible  that you may have a medical condition, including lung cancer, that is not found during your exam. This is called a false negative result.   False positives and more testing: LDCT very often finds something in the lung that could be cancer, but in fact is not. This is called a false positive result. False positive tests often cause anxiety. To make sure these findings are not cancer, you may need to have more tests. These tests will be done only if you give us permission. Sometimes patients need a treatment that can have side effects, such as a biopsy. For more information on false positives, see  What can I expect from the results?    Findings not related to lung cancer: Your LDCT exam also takes pictures of areas of your body next to your lungs. In a very small number of cases, the CT scan will show an abnormal finding in one of these areas, such as your kidneys, adrenal glands, liver or thyroid. This finding may not be serious, but you may need more tests. Your doctor can help you decide what other tests you may need, if any.  What can I expect from the results?  About 1 out of 4 LDCT exams will find something that may need more tests. Most of the time, these findings are lung nodules. Lung nodules are very small collections of tissue in the lung. These nodules are very common, and the vast majority--more than 97 percent--are not cancer (benign). Most are normal lymph nodes or small areas of scarring from past infections.  But, if a small lung nodule is found to be cancer, the cancer can be cured more than 90 percent of the time. To know if the nodule is cancer, we may need to get more images before your next yearly screening exam. If the nodule has suspicious features (for example, it is large, has an odd shape or grows over time), we will refer you to a specialist for further testing.  Will my doctor also get the results?  Yes. Your doctor will get a copy of your results.  Is it okay to keep smoking now that  there s a cancer screening exam?  No. Tobacco is one of the strongest cancer-causing agents. It causes not only lung cancer, but other cancers and cardiovascular (heart) diseases as well. The damage caused by smoking builds over time. This means that the longer you smoke, the higher your risk of disease. While it is never too late to quit, the sooner you quit, the better.  Where can I find help to quit smoking?  The best way to prevent lung cancer is to stop smoking. If you have already quit smoking, congratulations and keep it up! For help on quitting smoking, please call [employer] at 0-841-QUITNOW ([phone]) or the American Cancer Society at 1-368.727.5737 to find local resources near you.  One-on-one health coaching:  If you d prefer to work individually with a health care provider on tobacco cessation, we offer:     Medication Therapy Management:  Our specially trained pharmacists work closely with you and your doctor to help you quit smoking.  Call [phone] or [phone] (toll free).    Preventive Care Advice   This is general advice given by our system to help you stay healthy. However, your care team may have specific advice just for you. Please talk to your care team about your preventive care needs.  Nutrition  Eat 5 or more servings of fruits and vegetables each day.  Try wheat bread, brown rice and whole grain pasta (instead of white bread, rice, and pasta).  Get enough calcium and vitamin D. Check the label on foods and aim for 100% of the RDA (recommended daily allowance).  Lifestyle  Exercise at least 150 minutes each week   (30 minutes a day, 5 days a week).  Do muscle strengthening activities 2 days a week. These help control your weight and prevent disease.  No smoking.  Wear sunscreen to prevent skin cancer.  Have a dental exam and cleaning every 6 months.  Yearly exams  See your health care team every year to talk about:  Any changes in your health.  Any medicines your care team has  prescribed.  Preventive care, family planning, and ways to prevent chronic diseases.  Shots (vaccines)   HPV shots (up to age 26), if you've never had them before.  Hepatitis B shots (up to age 59), if you've never had them before.  COVID-19 shot: Get this shot when it's due.  Flu shot: Get a flu shot every year.  Tetanus shot: Get a tetanus shot every 10 years.  Pneumococcal, hepatitis A, and RSV shots: Ask your care team if you need these based on your risk.  Shingles shot (for age 50 and up).  General health tests  Diabetes screening:  Starting at age 35, Get screened for diabetes at least every 3 years.  If you are younger than age 35, ask your care team if you should be screened for diabetes.  Cholesterol test: At age 39, start having a cholesterol test every 5 years, or more often if advised.  Bone density scan (DEXA): At age 50, ask your care team if you should have this scan for osteoporosis (brittle bones).  Hepatitis C: Get tested at least once in your life.  STIs (sexually transmitted infections)  Before age 24: Ask your care team if you should be screened for STIs.  After age 24: Get screened for STIs if you're at risk. You are at risk for STIs (including HIV) if:  You are sexually active with more than one person.  You don't use condoms every time.  You or a partner was diagnosed with a sexually transmitted infection.  If you are at risk for HIV, ask about PrEP medicine to prevent HIV.  Get tested for HIV at least once in your life, whether you are at risk for HIV or not.  Cancer screening tests  Cervical cancer screening: If you have a cervix, begin getting regular cervical cancer screening tests at age 21. Most people who have regular screenings with normal results can stop after age 65. Talk about this with your provider.  Breast cancer scan (mammogram): If you've ever had breasts, begin having regular mammograms starting at age 40. This is a scan to check for breast cancer.  Colon cancer screening:  It is important to start screening for colon cancer at age 45.  Have a colonoscopy test every 10 years (or more often if you're at risk) Or, ask your provider about stool tests like a FIT test every year or Cologuard test every 3 years.  To learn more about your testing options, visit: https://www.[email or web address]/628820.pdf.  For help making a decision, visit: https://bit.ly/xl43091.  Prostate cancer screening test: If you have a prostate and are age 55 to 69, ask your provider if you would benefit from a yearly prostate cancer screening test.  Lung cancer screening: If you are a current or former smoker age 50 to 80, ask your care team if ongoing lung cancer screenings are right for you.  For informational purposes only. Not to replace the advice of your health care provider. Copyright   2023 [employer]. All rights reserved. Clinically reviewed by the [facility]itions [facility]. [employer] 387822 - REV 01/24.    Preventing Falls: Care Instructions  Injuries and health problems such as trouble walking or poor eyesight can increase your risk of falling. So can some medicines. But there are things you can do to help prevent falls. You can exercise to get stronger. You can also arrange your home to make it safer.    Talk to your doctor about the medicines you take. Ask if any of them increase the risk of falls and whether they can be changed or stopped.   Try to exercise regularly. It can help improve your strength and balance. This can help lower your risk of falling.     Practice fall safety and prevention.    Wear low-heeled shoes that fit well and give your feet good support. Talk to your doctor if you have foot problems that make this hard.  Carry a cellphone or wear a medical alert device that you can use to call for help.  Use stepladders instead of chairs to reach high objects. Don't climb if you're at risk for falls. Ask for help, if needed.  Wear the correct eyeglasses, if you need  "them.    Make your home safer.    Remove rugs, cords, clutter, and furniture from walkways.  Keep your house well lit. Use night-lights in hallways and bathrooms.  Install and use sturdy handrails on stairways.  Wear nonskid footwear, even inside. Don't walk barefoot or in socks without shoes.    Be safe outside.    Use handrails, curb cuts, and ramps whenever possible.  Keep your hands free by using a shoulder bag or backpack.  Try to walk in well-lit areas. Watch out for uneven ground, changes in pavement, and debris.  Be careful in the winter. Walk on the grass or gravel when sidewalks are slippery. Use de-icer on steps and walkways. Add non-slip devices to shoes.    Put grab bars and nonskid mats in your shower or tub and near the toilet. Try to use a shower chair or bath bench when bathing.   Get into a tub or shower by putting in your weaker leg first. Get out with your strong side first. Have a phone or medical alert device in the bathroom with you.   Where can you learn more?  Go to https://www.[employer].net/patiented  Enter G117 in the search box to learn more about \"Preventing Falls: Care Instructions.\"  Current as of: July 17, 2023               Content Version: 14.0    0523-1891 [employer].   Care instructions adapted under license by your healthcare professional. If you have questions about a medical condition or this instruction, always ask your healthcare professional. [employer] disclaims any warranty or liability for your use of this information.      Learning About Stress  What is stress?     Stress is your body's response to a hard situation. Your body can have a physical, emotional, or mental response. Stress is a fact of life for most people, and it affects everyone differently. What causes stress for you may not be stressful for someone else.  A lot of things can cause stress. You may feel stress when you go on a job interview, take a test, or run a race. This kind " of short-term stress is normal and even useful. It can help you if you need to work hard or react quickly. For example, stress can help you finish an important job on time.  Long-term stress is caused by ongoing stressful situations or events. Examples of long-term stress include long-term health problems, ongoing problems at work, or conflicts in your family. Long-term stress can harm your health.  How does stress affect your health?  When you are stressed, your body responds as though you are in danger. It makes hormones that speed up your heart, make you breathe faster, and give you a burst of energy. This is called the fight-or-flight stress response. If the stress is over quickly, your body goes back to normal and no harm is done.  But if stress happens too often or lasts too long, it can have bad effects. Long-term stress can make you more likely to get sick, and it can make symptoms of some diseases worse. If you tense up when you are stressed, you may develop neck, shoulder, or low back pain. Stress is linked to high blood pressure and heart disease.  Stress also harms your emotional health. It can make you hayden, tense, or depressed. Your relationships may suffer, and you may not do well at work or school.  What can you do to manage stress?  You can try these things to help manage stress:   Do something active. Exercise or activity can help reduce stress. Walking is a great way to get started. Even everyday activities such as housecleaning or yard work can help.  Try yoga or morenita chi. These techniques combine exercise and meditation. You may need some training at first to learn them.  Do something you enjoy. For example, listen to music or go to a movie. Practice your hobby or do volunteer work.  Meditate. This can help you relax, because you are not worrying about what happened before or what may happen in the future.  Do guided imagery. Imagine yourself in any setting that helps you feel calm. You can use  "online videos, books, or a teacher to guide you.  Do breathing exercises. For example:  From a standing position, bend forward from the waist with your knees slightly bent. Let your arms dangle close to the floor.  Breathe in slowly and deeply as you return to a standing position. Roll up slowly and lift your head last.  Hold your breath for just a few seconds in the standing position.  Breathe out slowly and bend forward from the waist.  Let your feelings out. Talk, laugh, cry, and express anger when you need to. Talking with supportive friends or family, a counselor, or a mitra leader about your feelings is a healthy way to relieve stress. Avoid discussing your feelings with people who make you feel worse.  Write. It may help to write about things that are bothering you. This helps you find out how much stress you feel and what is causing it. When you know this, you can find better ways to cope.  What can you do to prevent stress?  You might try some of these things to help prevent stress:  Manage your time. This helps you find time to do the things you want and need to do.  Get enough sleep. Your body recovers from the stresses of the day while you are sleeping.  Get support. Your family, friends, and community can make a difference in how you experience stress.  Limit your news feed. Avoid or limit time on social media or news that may make you feel stressed.  Do something active. Exercise or activity can help reduce stress. Walking is a great way to get started.  Where can you learn more?  Go to https://www.[employer].net/patiented  Enter N032 in the search box to learn more about \"Learning About Stress.\"  Current as of: October 24, 2023               Content Version: 14.0    2476-7072 [employer].   Care instructions adapted under license by your healthcare professional. If you have questions about a medical condition or this instruction, always ask your healthcare professional. [employer], " "[employer] disclaims any warranty or liability for your use of this information.      Eating Healthy Foods: Care Instructions  With every meal, you can make healthy food choices. Try to eat a variety of fruits, vegetables, whole grains, lean proteins, and low-fat dairy products. This can help you get the right balance of nutrients, including vitamins and minerals. Small changes add up over time. You can start by adding one healthy food to your meals each day.    Try to make half your plate fruits and vegetables, one-fourth whole grains, and one-fourth lean proteins. Try including dairy with your meals.   Eat more fruits and vegetables. Try to have them with most meals and snacks.   Foods for healthy eating    Fruits    These can be fresh, frozen, canned, or dried.  Try to choose whole fruit rather than fruit juice.  Eat a variety of colors.    Vegetables    These can be fresh, frozen, canned, or dried.  Beans, peas, and lentils count too.    Whole grains    Choose whole-grain breads, cereals, and noodles.  Try brown rice.    Lean proteins    These can include lean meat, poultry, fish, and eggs.  You can also have tofu, beans, peas, lentils, nuts, and seeds.    Dairy    Try milk, yogurt, and cheese.  Choose low-fat or fat-free when you can.  If you need to, use lactose-free milk or fortified plant-based milk products, such as soy milk.    Water    Drink water when you're thirsty.  Limit sugar-sweetened drinks, including soda, fruit drinks, and sports drinks.  Where can you learn more?  Go to https://www.[employer]BeiFeng.net/patiented  Enter T756 in the search box to learn more about \"Eating Healthy Foods: Care Instructions.\"  Current as of: September 20, 2023               Content Version: 14.0    8542-3591 [employer].   Care instructions adapted under license by your healthcare professional. If you have questions about a medical condition or this instruction, always ask your healthcare professional. " [employer] disclaims any warranty or liability for your use of this information.

## 2024-05-01 LAB
FEF 25/75: NORMAL
FEV-1: NORMAL
FEV1/FVC: NORMAL
FVC: NORMAL

## 2024-05-02 ENCOUNTER — TELEPHONE (OUTPATIENT)
Dept: FAMILY MEDICINE | Facility: CLINIC | Age: 54
End: 2024-05-02
Payer: COMMERCIAL

## 2024-05-02 DIAGNOSIS — B18.2 CHRONIC HEPATITIS C WITHOUT HEPATIC COMA (H): Primary | ICD-10-CM

## 2024-05-02 LAB — HCV RNA SERPL NAA+PROBE-ACNC: NOT DETECTED IU/ML

## 2024-05-02 NOTE — TELEPHONE ENCOUNTER
Spoke with pts caregiver Ashley about results. She states she received a call today about liver results. Reviewed the Hep C liver result from 4/30/24, but that is not what she is looking for. She states pt needs anti-virals and his liver bx showed hep C. The 4/30/24 result shown not detected. Reviewed that it would be best to call the Hepatology MD to better assist with results that she is looking for.  LAURA Russo, BSN

## 2024-05-02 NOTE — TELEPHONE ENCOUNTER
[facility] phone call message- general phone call:    Reason for call:     Pt left a voice mail message needing a return phone call in regarding to Lab results.     Return call needed: Yes    OK to leave a message on voice mail? Yes    Primary language: English      needed? No    Call taken on May 2, 2024 at 2:49 PM by Marely Prater

## 2024-05-28 ENCOUNTER — OFFICE VISIT (OUTPATIENT)
Dept: FAMILY MEDICINE | Facility: CLINIC | Age: 54
End: 2024-05-28
Payer: COMMERCIAL

## 2024-05-28 ENCOUNTER — HOSPITAL ENCOUNTER (OUTPATIENT)
Dept: CT IMAGING | Facility: HOSPITAL | Age: 54
Discharge: HOME | End: 2024-05-28
Attending: STUDENT IN AN ORGANIZED HEALTH CARE EDUCATION/TRAINING PROGRAM | Admitting: STUDENT IN AN ORGANIZED HEALTH CARE EDUCATION/TRAINING PROGRAM
Payer: COMMERCIAL

## 2024-05-28 VITALS
SYSTOLIC BLOOD PRESSURE: 86 MMHG | HEART RATE: 73 BPM | HEIGHT: 71 IN | DIASTOLIC BLOOD PRESSURE: 63 MMHG | BODY MASS INDEX: 28.56 KG/M2 | WEIGHT: 204 LBS | TEMPERATURE: 98.2 F | OXYGEN SATURATION: 99 % | RESPIRATION RATE: 16 BRPM

## 2024-05-28 DIAGNOSIS — F33.41 RECURRENT MAJOR DEPRESSIVE DISORDER, IN PARTIAL REMISSION (H): ICD-10-CM

## 2024-05-28 DIAGNOSIS — Z87.891 PERSONAL HISTORY OF TOBACCO USE: ICD-10-CM

## 2024-05-28 DIAGNOSIS — Z12.5 SCREENING FOR PROSTATE CANCER: ICD-10-CM

## 2024-05-28 DIAGNOSIS — Z12.11 SCREEN FOR COLON CANCER: Primary | ICD-10-CM

## 2024-05-28 DIAGNOSIS — F17.200 NICOTINE USE DISORDER: ICD-10-CM

## 2024-05-28 PROCEDURE — 36415 COLL VENOUS BLD VENIPUNCTURE: CPT

## 2024-05-28 PROCEDURE — 84154 ASSAY OF PSA FREE: CPT

## 2024-05-28 PROCEDURE — 90746 HEPB VACCINE 3 DOSE ADULT IM: CPT

## 2024-05-28 PROCEDURE — 84153 ASSAY OF PSA TOTAL: CPT

## 2024-05-28 PROCEDURE — 90471 IMMUNIZATION ADMIN: CPT

## 2024-05-28 PROCEDURE — 71271 CT THORAX LUNG CANCER SCR C-: CPT

## 2024-05-28 PROCEDURE — 99214 OFFICE O/P EST MOD 30 MIN: CPT | Mod: 25

## 2024-05-28 RX ORDER — QUETIAPINE FUMARATE 50 MG/1
50 TABLET, FILM COATED ORAL DAILY
Qty: 120 TABLET | Refills: 0 | Status: SHIPPED | OUTPATIENT
Start: 2024-05-28

## 2024-05-28 ASSESSMENT — ASTHMA QUESTIONNAIRES
QUESTION_2 LAST FOUR WEEKS HOW OFTEN HAVE YOU HAD SHORTNESS OF BREATH: THREE TO SIX TIMES A WEEK
QUESTION_3 LAST FOUR WEEKS HOW OFTEN DID YOUR ASTHMA SYMPTOMS (WHEEZING, COUGHING, SHORTNESS OF BREATH, CHEST TIGHTNESS OR PAIN) WAKE YOU UP AT NIGHT OR EARLIER THAN USUAL IN THE MORNING: ONCE OR TWICE
ACT_TOTALSCORE: 21
QUESTION_1 LAST FOUR WEEKS HOW MUCH OF THE TIME DID YOUR ASTHMA KEEP YOU FROM GETTING AS MUCH DONE AT WORK, SCHOOL OR AT HOME: A LITTLE OF THE TIME
QUESTION_5 LAST FOUR WEEKS HOW WOULD YOU RATE YOUR ASTHMA CONTROL: COMPLETELY CONTROLLED
ACT_TOTALSCORE: 21
QUESTION_4 LAST FOUR WEEKS HOW OFTEN HAVE YOU USED YOUR RESCUE INHALER OR NEBULIZER MEDICATION (SUCH AS ALBUTEROL): NOT AT ALL

## 2024-05-28 ASSESSMENT — PATIENT HEALTH QUESTIONNAIRE - PHQ9: SUM OF ALL RESPONSES TO PHQ QUESTIONS 1-9: 7

## 2024-05-28 NOTE — PROGRESS NOTES
Prior to immunization administration, verified patients identity using patient s name and date of birth. Please see Immunization Activity for additional information.     Screening Questionnaire for Adult Immunization    Are you sick today?   No   Do you have allergies to medications, food, a vaccine component or latex?   No   Have you ever had a serious reaction after receiving a vaccination?   No   Do you have a long-term health problem with heart, lung, kidney, or metabolic disease (e.g., diabetes), asthma, a blood disorder, no spleen, complement component deficiency, a cochlear implant, or a spinal fluid leak?  Are you on long-term aspirin therapy?   No   Do you have cancer, leukemia, HIV/AIDS, or any other immune system problem?   No   Do you have a parent, brother, or sister with an immune system problem?   No   In the past 3 months, have you taken medications that affect  your immune system, such as prednisone, other steroids, or anticancer drugs; drugs for the treatment of rheumatoid arthritis, Crohn s disease, or psoriasis; or have you had radiation treatments?   No   Have you had a seizure, or a brain or other nervous system problem?   No   During the past year, have you received a transfusion of blood or blood    products, or been given immune (gamma) globulin or antiviral drug?   No   For women: Are you pregnant or is there a chance you could become       pregnant during the next month?   No   Have you received any vaccinations in the past 4 weeks?   No     Immunization questionnaire answers were all negative.      Patient instructed to remain in clinic for 15 minutes afterwards, and to report any adverse reactions.     Screening performed by Angelina Rose on 5/28/2024 at 3:42 PM.

## 2024-05-28 NOTE — PROGRESS NOTES
Preceptor Attestation:    I discussed the patient with the resident and evaluated the patient in person. I have verified the content of the note, which accurately reflects my assessment of the patient and the plan of care.   Supervising Physician:  Zan Osborn MD.        BP Readings from Last 3 Encounters:   05/28/24 (!) 86/63   04/30/24 104/70   04/16/24 112/78    Wt Readings from Last 3 Encounters:   05/28/24 92.5 kg (204 lb)   04/30/24 97.1 kg (214 lb)   04/16/24 91.2 kg (201 lb)
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
## 2024-05-28 NOTE — PATIENT INSTRUCTIONS
Thanks for coming to see me.     Today, we're going to get a prostate screening lab. Given your father's history of prostate cancer, this will help us determine if we need to do further work-up.     I've also placed an order for a colonoscopy given you're due for one. If you continue to have bloody stools, come back and we cna have that discussion.     With regards to your sleep, Seroquel is helping with that, however it's making you drowsy. Let's go down on that dosage from 100 to 50mg a day. This is also for your mood, so if your mood becomes unstable, we may have to go back up to 100mg. Medical marijuana is something to consider for pain, but it's not a good option for sleep. Unfortunately, I'm currently not licensed to prescribe, however we do have a few providers who are. If you're interested, you can call the  to make an appointment with them.     I've also refilled your nicotine gum. You'll get your second hepatitis B shot today.

## 2024-05-29 ENCOUNTER — TELEPHONE (OUTPATIENT)
Dept: FAMILY MEDICINE | Facility: CLINIC | Age: 54
End: 2024-05-29
Payer: COMMERCIAL

## 2024-05-29 LAB
PSA FREE MFR SERPL: 34.88 %
PSA FREE SERPL-MCNC: 0.3 NG/ML
PSA SERPL DL<=0.01 NG/ML-MCNC: 0.86 NG/ML (ref 0–3.5)

## 2024-05-29 NOTE — TELEPHONE ENCOUNTER
Patient Returning Call    Reason for call:  Pt would like to go on medical marijuana.  He has spoke to Dr Casarez regarding this.    Information relayed to patient:  yes    Patient has additional questions:  No      Could we send this information to you in [facility] or would you prefer to receive a phone call?:   Patient would prefer a phone call   Okay to leave a detailed message?: Yes at Other phone number:  [phone]     Does patient or caller know when to expect a call? Yes, Nurses will return call within 2-3 business hours.       Lj Gonzalez on 5/29/2024 at 9:50 AM

## 2024-06-01 PROBLEM — J45.909 ASTHMA: Status: ACTIVE | Noted: 2024-01-22

## 2024-06-01 PROBLEM — J18.9 PNEUMONIA INVOLVING LEFT LUNG: Status: RESOLVED | Noted: 2024-01-22 | Resolved: 2024-06-01

## 2024-06-01 PROBLEM — B19.20 HEPATITIS C VIRUS INFECTION: Status: ACTIVE | Noted: 2023-12-13

## 2024-06-01 PROBLEM — J44.9 COPD (CHRONIC OBSTRUCTIVE PULMONARY DISEASE) (H): Status: ACTIVE | Noted: 2024-01-22

## 2024-06-01 PROBLEM — F11.90 OPIOID USE DISORDER: Status: ACTIVE | Noted: 2020-08-12

## 2024-06-01 PROBLEM — Z94.5 STATUS POST SKIN GRAFT: Status: ACTIVE | Noted: 2020-08-07

## 2024-06-01 RX ORDER — CEFUROXIME AXETIL 500 MG/1
500 TABLET ORAL 2 TIMES DAILY
COMMUNITY
Start: 2024-01-23 | End: 2024-01-28

## 2024-06-01 RX ORDER — PREDNISONE 10 MG/1
TABLET ORAL
COMMUNITY
Start: 2024-01-23 | End: 2024-02-04

## 2024-06-01 RX ORDER — ARIPIPRAZOLE 5 MG/1
5 TABLET ORAL DAILY
COMMUNITY
Start: 2023-12-07 | End: 2024-01-07

## 2024-06-01 RX ORDER — BUPRENORPHINE HYDROCHLORIDE, NALOXONE HYDROCHLORIDE 4; 1 MG/1; MG/1
1 FILM, SOLUBLE BUCCAL; SUBLINGUAL 2 TIMES DAILY
COMMUNITY
Start: 2023-12-02 | End: 2023-12-22

## 2024-06-01 RX ORDER — GABAPENTIN 100 MG/1
CAPSULE ORAL
COMMUNITY
Start: 2023-12-21 | End: 2024-01-21

## 2024-06-01 RX ORDER — AZITHROMYCIN 500 MG/1
500 TABLET, FILM COATED ORAL ONCE
COMMUNITY
Start: 2024-01-23 | End: 2024-01-23

## 2024-06-01 RX ORDER — RAMELTEON 8 MG/1
8 TABLET ORAL AT BEDTIME
COMMUNITY
Start: 2023-12-01 | End: 2023-12-31

## 2024-06-01 RX ORDER — CLONIDINE HYDROCHLORIDE 0.1 MG/1
0.1 TABLET ORAL AT BEDTIME
COMMUNITY
Start: 2023-12-07 | End: 2023-12-12

## 2024-06-01 RX ORDER — ATOMOXETINE 40 MG/1
40 CAPSULE ORAL DAILY
COMMUNITY
Start: 2023-12-02 | End: 2023-12-31

## 2024-06-01 NOTE — PROGRESS NOTES
"Fito has past diagnoses have included bipolar 1 disorder, PTSD, \"borderline schizophrenia\", depression, ADD, and substance use disorder (meth and opioid). He has had past suicide attempts including overdose and coming close to jumping from a bridge.   On interview today, I asked him his goals for our visit and he noted interest in getting medical marijuana because this works better than Seroquel. Seroquel makes his blood pressure low, but medical marijuana calms him down and helps with his bipolar.   I asked about what was going on most recently. Sometimes he is trying to calm down, but feels like he snaps on people. He doesn't want to take pills. Doesn't like when people are preachy at him, or mess with him. Deals with this by going for walks, getting away.   Mood has been edgy, a little depressed at times. Going to sleep at night is an issue at times, but okay right now.   At this point I started to review Dr. Casarez's clinical notes with him and go over what the main objectives for the day would be, including his history and past treatments. He interrupted and asked \"so you don't prescribe medical marijuana cards?\" I confirmed that I don't prescribe medical marijuana, but that there are a lot of management options that can be useful for managing anxiety as well as mood stability.   He stated that he doesn't want us to waste our time, and that he can find another provider to get him a medical marijuana card, and that he has to get to his methadone clinic this morning, and stood up to leave. I again tried to offer that there are other treatment options that we could discuss, but he declined and left the visit.     Recommendations based on chart review and brief interaction:   - Unfortunately I can not recommend medical marijuana for treatment of anxiety. It is a potent short acting anxiolytic with psychiatric effect and psychiatric risks similar to benzodiazepines or alcohol. Short acting anxiolytics lead to " worsening of anxiety over time and are not appropriate for long term treatment of anxiety or insomnia. This recommendation does not account for the appropriateness of using medical marijuana for treating other medical conditions.   - If a as needed anxiolytic is needed, consider gabapentin at higher dose of 600-1200mg daily as needed for anxiety / sleep.   - Given that he feels that Seroquel has been sedating, and that he notes significant difficulties with irritability, I would recommend having him try clonidine or guanfacine. He was recently briefly on clonidine but does not recall details about this, per recent progress note. However, he also noted to me today that he has had trouble with low blood pressure recently. Given this, I would recommend trying guanfacine, starting with the ER formulation.   - Guanfacine ER 1mg at bedtime, increase to 2mg after 1 week, increase to 3mg after another 2 weeks if tolerating.   - Consider mirtazapine for an antidepressant option that he may tolerate better.     I compiled this list of past psych meds from the medical records.    Medication Max Dose (mg) Dates / Duration Helpful? DC Reason / Adverse Effects?   Prozac 40      Zoloft 50 2023     Bupropion  BID      Abilify 5 12/23     Seroquel       hydroxyzine 2015  PRN     gabapentin 600 2015     trazodone 50 PRN 2015     Rozerem 8 12/23     clonidine 0.1 QHS 12/23     Strattera 40 12/23     Suboxone 4-1 BID      Methadone 100        I spent 35 minutes reviewing records, including past meds, doses, dates, as well as medical history, past diagnoses, and timeline of relevant events. Patient was seen today 6/3/24.   Spent 20 minutes on medication recommendations for a total of 55 minutes. Patient was present for 8 minutes.     -Angel Camacho MD

## 2024-06-03 ENCOUNTER — OFFICE VISIT (OUTPATIENT)
Dept: PSYCHOLOGY | Facility: CLINIC | Age: 54
End: 2024-06-03
Payer: COMMERCIAL

## 2024-06-03 VITALS
TEMPERATURE: 97.9 F | SYSTOLIC BLOOD PRESSURE: 117 MMHG | WEIGHT: 195.4 LBS | HEIGHT: 70 IN | BODY MASS INDEX: 27.97 KG/M2 | HEART RATE: 58 BPM | DIASTOLIC BLOOD PRESSURE: 76 MMHG | RESPIRATION RATE: 12 BRPM | OXYGEN SATURATION: 98 %

## 2024-06-03 DIAGNOSIS — F41.9 ANXIETY: ICD-10-CM

## 2024-06-03 PROCEDURE — 99204 OFFICE O/P NEW MOD 45 MIN: CPT | Performed by: PSYCHIATRY & NEUROLOGY

## 2024-06-03 ASSESSMENT — PATIENT HEALTH QUESTIONNAIRE - PHQ9: SUM OF ALL RESPONSES TO PHQ QUESTIONS 1-9: 7

## 2024-06-03 NOTE — Clinical Note
Rah Casarez, thanks for the referral! Unfortunately Fito wasn't too interested in talking after I confirmed that I do not prescribe medical marijuana, but I did come up with a few recommendations that I think may be helpful for him, and made a chart of past psychiatric medications as well.   Please let me know if there are any questions.

## 2024-06-04 DIAGNOSIS — F43.10 PTSD (POST-TRAUMATIC STRESS DISORDER): Primary | ICD-10-CM

## 2024-06-05 ENCOUNTER — TELEPHONE (OUTPATIENT)
Dept: FAMILY MEDICINE | Facility: CLINIC | Age: 54
End: 2024-06-05

## 2024-06-05 ENCOUNTER — OFFICE VISIT (OUTPATIENT)
Dept: FAMILY MEDICINE | Facility: CLINIC | Age: 54
End: 2024-06-05
Payer: COMMERCIAL

## 2024-06-05 VITALS
SYSTOLIC BLOOD PRESSURE: 119 MMHG | WEIGHT: 194.6 LBS | RESPIRATION RATE: 16 BRPM | BODY MASS INDEX: 27.92 KG/M2 | HEART RATE: 61 BPM | DIASTOLIC BLOOD PRESSURE: 77 MMHG | TEMPERATURE: 97.9 F | OXYGEN SATURATION: 96 %

## 2024-06-05 DIAGNOSIS — F31.9 BIPOLAR 1 DISORDER (H): Primary | ICD-10-CM

## 2024-06-05 DIAGNOSIS — F21: ICD-10-CM

## 2024-06-05 DIAGNOSIS — F43.10 PTSD (POST-TRAUMATIC STRESS DISORDER): ICD-10-CM

## 2024-06-05 DIAGNOSIS — B18.2 CHRONIC HEPATITIS C WITHOUT HEPATIC COMA (H): ICD-10-CM

## 2024-06-05 LAB
AMPHETAMINES UR QL: DETECTED
BARBITURATES UR QL SCN: NOT DETECTED
BENZODIAZ UR QL SCN: NOT DETECTED
BUPRENORPHINE UR QL: NOT DETECTED
CANNABINOIDS UR QL: DETECTED
COCAINE UR QL SCN: NOT DETECTED
D-METHAMPHET UR QL: DETECTED
METHADONE UR QL SCN: DETECTED
OPIATES UR QL SCN: NOT DETECTED
OXYCODONE UR QL SCN: NOT DETECTED
PCP UR QL SCN: NOT DETECTED
TRICYCLICS UR QL SCN: DETECTED

## 2024-06-05 PROCEDURE — 80306 DRUG TEST PRSMV INSTRMNT: CPT | Performed by: FAMILY MEDICINE

## 2024-06-05 PROCEDURE — 36415 COLL VENOUS BLD VENIPUNCTURE: CPT | Performed by: FAMILY MEDICINE

## 2024-06-05 PROCEDURE — 87522 HEPATITIS C REVRS TRNSCRPJ: CPT | Performed by: FAMILY MEDICINE

## 2024-06-05 PROCEDURE — 99214 OFFICE O/P EST MOD 30 MIN: CPT | Performed by: FAMILY MEDICINE

## 2024-06-05 NOTE — PATIENT INSTRUCTIONS
[facility] Adult Crisis:  [phone]    [facility] Service Providers    [facility] for [facility] program:  [address] [place] [address] [address], [place] [address]  [phone]  Call if there are questions or fill out the online form from:  [email or web address]    [email or web address] [facility] ARM program  [address] [place] [address] [id number] [phone] (fax)    [facility]  [address] [place] [id number] [phone]    [address] [address]  Phone: [phone]  Fax: [phone]    [address] [place] [address]  Phone: [phone]  Fax: [phone]    [facility] Psychology Support Services  [address]  [address]  [place] [address]  Phone: [phone]  Fax: [phone]    Hours  Monday - Friday: 9am - 5pm    Can complete the referral form online ([email or web address]) and fax it to [phone] or call office directly to make your referral by phone.      [facility]  [address], [address]  [place], [place]   [address]    Call to schedule an intake or inquire about the [facility] program or fill out online forms:  Werner Roth  Office:  [phone] ext [phone]  Mobile:  [phone]    [facility]  [address] [place] [address]  [address]  [place] [address]  [phone]    [address] [phone]  Accepts referrals via fax only and they will reach out to patient.  No way for us to schedule visit for patient directly.  Walk-in evaluations are available in-person (walk into clinic) or virtually (call us) Monday through Thursday from 9 a.m. - 3 p.m. and Friday from 9 a.m. - 2 p.m. with no appointment needed for adults and youth (ages 6-17) with a parent or guardian.

## 2024-06-05 NOTE — TELEPHONE ENCOUNTER
General Call      Reason for Call:  She needs a call back re the patient needing a diagnostic assessment and a arms worker. she thinks this might be something that has been talked about by  because he is a mental health provider here and that is something that is usually worked out through mental health    What are your questions or concerns:  ?    Date of last appointment with provider: ?    Could we send this information to you in [facility] or would you prefer to receive a phone call?:   Patient would prefer a phone call   Okay to leave a detailed message?: Yes at Home number on file [phone] (home)    Does patient or caller know when to expect a call? Yes, Nurses will return call within 2-3 business hours.       Bryan Madera on 6/5/2024 at 10:24 AM
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
## 2024-06-06 LAB
HCV RNA SERPL NAA+PROBE-ACNC: 95 IU/ML
HCV RNA SERPL NAA+PROBE-LOG IU: 2 {LOG_IU}/ML

## 2024-06-09 DIAGNOSIS — B18.2 CHRONIC HEPATITIS C WITHOUT HEPATIC COMA (H): Primary | ICD-10-CM

## 2024-06-17 ENCOUNTER — MYC MEDICAL ADVICE (OUTPATIENT)
Dept: FAMILY MEDICINE | Facility: CLINIC | Age: 54
End: 2024-06-17
Payer: COMMERCIAL

## 2024-06-26 ENCOUNTER — OFFICE VISIT (OUTPATIENT)
Dept: FAMILY MEDICINE | Facility: CLINIC | Age: 54
End: 2024-06-26
Payer: COMMERCIAL

## 2024-06-26 VITALS
OXYGEN SATURATION: 98 % | TEMPERATURE: 98.2 F | HEIGHT: 70 IN | RESPIRATION RATE: 16 BRPM | SYSTOLIC BLOOD PRESSURE: 102 MMHG | WEIGHT: 194.2 LBS | BODY MASS INDEX: 27.8 KG/M2 | DIASTOLIC BLOOD PRESSURE: 70 MMHG | HEART RATE: 76 BPM

## 2024-06-26 DIAGNOSIS — B18.2 CHRONIC HEPATITIS C WITHOUT HEPATIC COMA (H): ICD-10-CM

## 2024-06-26 DIAGNOSIS — Z86.59 HISTORY OF BEHAVIORAL AND MENTAL HEALTH PROBLEMS: Primary | ICD-10-CM

## 2024-06-26 DIAGNOSIS — M25.50 MULTIPLE JOINT PAIN: ICD-10-CM

## 2024-06-26 PROCEDURE — 99000 SPECIMEN HANDLING OFFICE-LAB: CPT

## 2024-06-26 PROCEDURE — 99214 OFFICE O/P EST MOD 30 MIN: CPT | Mod: GC

## 2024-06-26 PROCEDURE — 86431 RHEUMATOID FACTOR QUANT: CPT

## 2024-06-26 PROCEDURE — 87902 NFCT AGT GNTYP ALYS HEP C: CPT | Mod: 90

## 2024-06-26 PROCEDURE — 86038 ANTINUCLEAR ANTIBODIES: CPT

## 2024-06-26 PROCEDURE — 87522 HEPATITIS C REVRS TRNSCRPJ: CPT

## 2024-06-26 PROCEDURE — 36415 COLL VENOUS BLD VENIPUNCTURE: CPT

## 2024-06-26 PROCEDURE — 86200 CCP ANTIBODY: CPT

## 2024-06-26 NOTE — LETTER
July 2, 2024      Fito Daniel  [address]  [place] [address]        Dear ,    We are writing to inform you of your test results.      Negative labs for rheumatoid factor.     Resulted Orders   Antinuclear Ab Cascade   Result Value Ref Range    ANNALISA interpretation Negative Negative      Comment:        Negative:              <1:40  Borderline Positive:   1:40 - 1:80  Positive:              >1:80   Cyclic Citrullinated Peptide Antibody IgG   Result Value Ref Range    Cyclic Citrullinated Peptide Antibody IgG 0.8 <7.0 U/mL      Comment:      Negative   Rheumatoid factor   Result Value Ref Range    Rheumatoid Factor <10 <14 IU/mL       If you have any questions or concerns, please call the clinic at the number listed above.       Sincerely,      Tor Casarez MD

## 2024-06-26 NOTE — PROGRESS NOTES
Preceptor Attestation:    I discussed the patient with the resident and evaluated the patient in person. I have verified the content of the note, which accurately reflects my assessment of the patient and the plan of care.   Supervising Physician:  Quentin Uriostegui DO.

## 2024-06-26 NOTE — PATIENT INSTRUCTIONS
Thanks for coming to see me today Fito. It's great to hear you're doing well. Keep up the great work.    For your joint pain, I've prescribed some voltaren gel that you can use as needed for pain. We're also getting some labs to investigate rheumatoid arthritis.     I'm happy to hear your mood is good. Let us know if you need any assistance in that. Keep up therapy and take your seroquel daily.     Let's follow-up in 1 month.

## 2024-06-26 NOTE — PROGRESS NOTES
Assessment & Plan     History of behavioral and mental health problems  Mood doing well on Seroquel 50mg at bedtime. Established therapy at [facility]. Will continue this management. Has stable housing.      Multiple joint pain  Tapering off Methadone due to concerns of liver dz. Tolerating that well and using medical cannabis with some relief. Endorsing multiple joint pain improved with activity. Exam pertinent for L MCP deformity and BL knee crepitus. Fam hx of RA. Will obtain labs as below.   - diclofenac (VOLTAREN) 1 % topical gel; Apply 2 g topically 4 times daily  - Rheumatoid factor; Future  - Cyclic Citrullinated Peptide Antibody IgG; Future  - Antinuclear Ab Cascade; Future    Chronic hepatitis C without hepatic coma (H)  Obtained hep C labs ordered by hepatologist, Dr. Brooke. Will defer to her expertise for hep C management.   - Hepatitis C RNA, Quantitative by PCR  - Hepatitis C High Resolution Genotype    Patient seen and discussed with Dr. Quentin Casarez MD, MPH   PGY-1  [facility] [address] [place] [address]  [phone]    Return in about 4 weeks (around 7/24/2024).    Janny Payton is a 54 year old, presenting for the following health issues:  Follow Up (Lab results and antidepressant meds )    HPI     Decided he wanted to wean off of methadone given concerns of liver health. Currently on 120mg of methadone with taper. Says that weaning process is going okay, but endorsing some increased joint pain. Medical cannabis is helping. Patient has stable housing, in a multi-unit building with 5 different people. He has his own room. No concern for safety.     Doing well on 50mg seroquel daily. Helps with sleep and mood. States that mood is good today. No thoughts of SI or HI.  Going to therapy with Frederic Swenson at [facility] on [facility] [facility]. Has had 3 sessions.     RA: mom has a hx of RA. States that he has thumb pain and  "bilateral knee pain. Improves with movement throughout the day.     Review of Systems  Negative unless stated in HPI         Objective    /70 (BP Location: Left arm, Patient Position: Sitting, Cuff Size: Adult Regular)   Pulse 76   Temp 98.2  F (36.8  C) (Oral)   Resp 16   Ht 1.778 m (5' 10\")   Wt 88.1 kg (194 lb 3.2 oz)   SpO2 98%   BMI 27.86 kg/m    Body mass index is 27.86 kg/m .  Physical Exam   GENERAL: alert and no distress  RESP: lungs clear to auscultation - no rales, rhonchi or wheezes  CV: regular rate and rhythm, no m/r/g  ABDOMEN: soft, nontender, no hepatosplenomegaly, no masses and bowel sounds normal  MS: L MCP deformity with mild pain of joint; crepitus of BL knees            Signed Electronically by: Tor Casarez MD    "

## 2024-06-27 DIAGNOSIS — B18.2 CHRONIC HEPATITIS C WITHOUT HEPATIC COMA (H): Primary | ICD-10-CM

## 2024-06-27 LAB
CCP AB SER IA-ACNC: 0.8 U/ML
HCV RNA SERPL NAA+PROBE-ACNC: NOT DETECTED IU/ML
RHEUMATOID FACT SERPL-ACNC: <10 IU/ML

## 2024-06-28 LAB — ANA SER QL IF: NEGATIVE

## 2024-07-03 LAB — HCV GENTYP SERPL NAA+PROBE: NORMAL

## 2024-07-09 ENCOUNTER — TELEPHONE (OUTPATIENT)
Dept: GASTROENTEROLOGY | Facility: CLINIC | Age: 54
End: 2024-07-09
Payer: COMMERCIAL

## 2024-07-09 NOTE — TELEPHONE ENCOUNTER
Per chart review, pt's labs on 6/26 detected no hepatitis C viral level. Dr. Brooke recommended pt get repeat labs in 6 months.

## 2024-09-22 ENCOUNTER — APPOINTMENT (OUTPATIENT)
Dept: URGENT CARE | Facility: URGENT CARE | Age: 54
End: 2024-09-22
Payer: COMMERCIAL

## 2024-10-30 ENCOUNTER — MEDICAL CORRESPONDENCE (OUTPATIENT)
Dept: HEALTH INFORMATION MANAGEMENT | Facility: CLINIC | Age: 54
End: 2024-10-30
Payer: COMMERCIAL

## 2024-10-30 ENCOUNTER — TRANSFERRED RECORDS (OUTPATIENT)
Dept: HEALTH INFORMATION MANAGEMENT | Facility: CLINIC | Age: 54
End: 2024-10-30
Payer: COMMERCIAL

## 2024-12-02 NOTE — TELEPHONE ENCOUNTER
DIAGNOSIS: Right foot pain happening for a couple months now.     APPOINTMENT DATE: 12.7.24   NOTES STATUS DETAILS   DISCHARGE REPORT from the ER CE 10.2.15  [facility]    9.25.15  Evita [facility]    MEDICATION LIST Internal    XRAYS (IMAGES & REPORTS) Internal 10.25.15  XR Foot Right

## 2024-12-04 DIAGNOSIS — M79.671 RIGHT FOOT PAIN: ICD-10-CM
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
## 2024-12-07 ENCOUNTER — OFFICE VISIT (OUTPATIENT)
Dept: ORTHOPEDICS | Facility: CLINIC | Age: 54
End: 2024-12-07
Payer: COMMERCIAL

## 2024-12-07 ENCOUNTER — ANCILLARY PROCEDURE (OUTPATIENT)
Dept: GENERAL RADIOLOGY | Facility: CLINIC | Age: 54
End: 2024-12-07
Attending: FAMILY MEDICINE
Payer: COMMERCIAL

## 2024-12-07 ENCOUNTER — PRE VISIT (OUTPATIENT)
Dept: ORTHOPEDICS | Facility: CLINIC | Age: 54
End: 2024-12-07

## 2024-12-07 DIAGNOSIS — R20.0 NUMBNESS OF RIGHT FOOT: Primary | ICD-10-CM

## 2024-12-07 DIAGNOSIS — M79.671 RIGHT FOOT PAIN: ICD-10-CM

## 2024-12-07 DIAGNOSIS — R26.9 GAIT ABNORMALITY: ICD-10-CM

## 2024-12-07 PROCEDURE — 73630 X-RAY EXAM OF FOOT: CPT | Mod: RT | Performed by: RADIOLOGY

## 2024-12-07 PROCEDURE — 99203 OFFICE O/P NEW LOW 30 MIN: CPT | Performed by: FAMILY MEDICINE
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
## 2025-06-01 ENCOUNTER — HEALTH MAINTENANCE LETTER (OUTPATIENT)
Age: 55
End: 2025-06-01

## 2025-09-03 ENCOUNTER — OFFICE VISIT (OUTPATIENT)
Dept: FAMILY MEDICINE | Facility: CLINIC | Age: 55
End: 2025-09-03
Payer: COMMERCIAL

## 2025-09-03 VITALS
HEIGHT: 71 IN | WEIGHT: 189 LBS | TEMPERATURE: 97.2 F | HEART RATE: 77 BPM | OXYGEN SATURATION: 96 % | RESPIRATION RATE: 16 BRPM | SYSTOLIC BLOOD PRESSURE: 120 MMHG | DIASTOLIC BLOOD PRESSURE: 76 MMHG | BODY MASS INDEX: 26.46 KG/M2

## 2025-09-03 DIAGNOSIS — J45.909 REACTIVE AIRWAY DISEASE WITHOUT COMPLICATION, UNSPECIFIED ASTHMA SEVERITY, UNSPECIFIED WHETHER PERSISTENT: ICD-10-CM

## 2025-09-03 DIAGNOSIS — F11.20 OPIOID DEPENDENCE, UNCOMPLICATED (H): ICD-10-CM

## 2025-09-03 DIAGNOSIS — F20.9 SCHIZOPHRENIA, UNSPECIFIED TYPE (H): ICD-10-CM

## 2025-09-03 DIAGNOSIS — J44.9 CHRONIC OBSTRUCTIVE PULMONARY DISEASE, UNSPECIFIED COPD TYPE (H): ICD-10-CM

## 2025-09-03 DIAGNOSIS — F11.90 CHRONIC, CONTINUOUS USE OF OPIOIDS: ICD-10-CM

## 2025-09-03 DIAGNOSIS — F19.20 POLYSUBSTANCE DEPENDENCE (H): ICD-10-CM

## 2025-09-03 DIAGNOSIS — Z86.59 HISTORY OF BEHAVIORAL AND MENTAL HEALTH PROBLEMS: ICD-10-CM

## 2025-09-03 DIAGNOSIS — B18.2 CHRONIC HEPATITIS C WITHOUT HEPATIC COMA (H): Primary | ICD-10-CM

## 2025-09-03 RX ORDER — ALBUTEROL SULFATE 90 UG/1
2 INHALANT RESPIRATORY (INHALATION) EVERY 6 HOURS PRN
Qty: 18 G | Refills: 0 | Status: SHIPPED | OUTPATIENT
Start: 2025-09-03

## 2025-09-03 SDOH — HEALTH STABILITY: PHYSICAL HEALTH: ON AVERAGE, HOW MANY DAYS PER WEEK DO YOU ENGAGE IN MODERATE TO STRENUOUS EXERCISE (LIKE A BRISK WALK)?: 4 DAYS

## 2025-09-03 ASSESSMENT — PATIENT HEALTH QUESTIONNAIRE - PHQ9
SUM OF ALL RESPONSES TO PHQ QUESTIONS 1-9: 15
SUM OF ALL RESPONSES TO PHQ QUESTIONS 1-9: 15
10. IF YOU CHECKED OFF ANY PROBLEMS, HOW DIFFICULT HAVE THESE PROBLEMS MADE IT FOR YOU TO DO YOUR WORK, TAKE CARE OF THINGS AT HOME, OR GET ALONG WITH OTHER PEOPLE: VERY DIFFICULT

## 2025-09-04 PROBLEM — J44.9 COPD (CHRONIC OBSTRUCTIVE PULMONARY DISEASE) (H): Status: RESOLVED | Noted: 2024-01-22 | Resolved: 2025-09-04

## 2025-09-04 PROBLEM — F20.9 SCHIZOPHRENIA, UNSPECIFIED TYPE (H): Status: ACTIVE | Noted: 2025-09-04

## 2025-09-04 PROBLEM — F19.20 POLYSUBSTANCE DEPENDENCE (H): Status: ACTIVE | Noted: 2025-09-04

## 2025-09-04 PROBLEM — F11.20 OPIOID DEPENDENCE, UNCOMPLICATED (H): Status: ACTIVE | Noted: 2020-08-12

## (undated) DEVICE — DRSG PRIMAPORE 02X3" 7133

## (undated) DEVICE — CATH IV 16X1-1/4 PROTECTIV 326210

## (undated) DEVICE — SOL NACL 0.9% INJ 250ML BAG 2B1322Q

## (undated) DEVICE — GLOVE BIOGEL PI ULTRATOUCH G SZ 8.0 42180

## (undated) DEVICE — NDL BIOPSY TEMNO 18GAX15CM ACT1815

## (undated) DEVICE — GELFOAM 7X12MM

## (undated) DEVICE — LINEN GOWN XLG 5407

## (undated) DEVICE — GOWN XLG DISP 9545

## (undated) DEVICE — Device

## (undated) DEVICE — BLADE KNIFE SURG 11 371111

## (undated) DEVICE — NDL 18GAX1.5" 305185

## (undated) DEVICE — WIRE GUIDE BENSON STR .035X50CM G00661

## (undated) DEVICE — PROBE COVER INTRAOPERATIVE 5"X96" PC1308

## (undated) DEVICE — DECANTER BAG 2002S

## (undated) DEVICE — LINEN TOWEL PACK X5 5464

## (undated) DEVICE — SPECIMEN CONTAINER W/10% BUFFERED FORMALIN 120ML 591201

## (undated) RX ORDER — LIDOCAINE HYDROCHLORIDE 10 MG/ML
INJECTION, SOLUTION EPIDURAL; INFILTRATION; INTRACAUDAL; PERINEURAL
Status: DISPENSED
Start: 2024-04-15